# Patient Record
Sex: FEMALE | Race: WHITE | NOT HISPANIC OR LATINO | Employment: OTHER | URBAN - METROPOLITAN AREA
[De-identification: names, ages, dates, MRNs, and addresses within clinical notes are randomized per-mention and may not be internally consistent; named-entity substitution may affect disease eponyms.]

---

## 2017-02-16 ENCOUNTER — OFFICE VISIT (OUTPATIENT)
Dept: MEDICAL GROUP | Facility: PHYSICIAN GROUP | Age: 67
End: 2017-02-16
Payer: COMMERCIAL

## 2017-02-16 VITALS
HEART RATE: 62 BPM | RESPIRATION RATE: 14 BRPM | WEIGHT: 137 LBS | HEIGHT: 62 IN | OXYGEN SATURATION: 97 % | DIASTOLIC BLOOD PRESSURE: 78 MMHG | TEMPERATURE: 97.8 F | BODY MASS INDEX: 25.21 KG/M2 | SYSTOLIC BLOOD PRESSURE: 128 MMHG

## 2017-02-16 DIAGNOSIS — Z12.39 SCREENING FOR BREAST CANCER: ICD-10-CM

## 2017-02-16 DIAGNOSIS — R92.2 DENSE BREASTS: ICD-10-CM

## 2017-02-16 DIAGNOSIS — R92.30 DENSE BREASTS: ICD-10-CM

## 2017-02-16 DIAGNOSIS — M25.511 CHRONIC RIGHT SHOULDER PAIN: ICD-10-CM

## 2017-02-16 DIAGNOSIS — G89.29 CHRONIC RIGHT SHOULDER PAIN: ICD-10-CM

## 2017-02-16 DIAGNOSIS — E89.40 POST HYSTERECTOMY MENOPAUSE SYNDROME: ICD-10-CM

## 2017-02-16 DIAGNOSIS — Z90.710 POST HYSTERECTOMY MENOPAUSE SYNDROME: ICD-10-CM

## 2017-02-16 PROCEDURE — 99214 OFFICE O/P EST MOD 30 MIN: CPT | Performed by: FAMILY MEDICINE

## 2017-02-16 NOTE — MR AVS SNAPSHOT
"        Jon Garcia   2017 10:40 AM   Office Visit   MRN: [id number]    Department:  [facility]   Dept Phone:  [phone]    Description:  Female : 1950   Provider:  Bertha Gómez M.D.           Reason for Visit     Follow-Up shoulder      Allergies as of 2017     Allergen Noted Reactions    Ceftin [Cefuroxime Axetil] 10/07/2015       Pruritis      You were diagnosed with     Chronic right shoulder pain   [723356]       Post hysterectomy menopause syndrome   [492144]       Dense breasts   [934048]       Screening for breast cancer   [818777]         Vital Signs     Blood Pressure Pulse Temperature Respirations Height Weight    128/78 mmHg 62 36.6 °C (97.8 °F) 14 1.575 m (5' 2.01\") 62.143 kg (137 lb)    Body Mass Index Oxygen Saturation Smoking Status             25.05 kg/m2 97% Former Smoker         Basic Information     Date Of Birth Sex Race Ethnicity Preferred Language    1950 Female Unable to Obtain Unknown English      Problem List              ICD-10-CM Priority Class Noted - Resolved    Post hysterectomy menopause syndrome E89.40, Z90.710   10/7/2015 - Present    Herpes simplex labialis B00.1   10/7/2015 - Present    Vitamin D deficiency E55.9   10/7/2015 - Present    Routine health maintenance Z00.00   10/7/2015 - Present    Chronic neck pain M54.2, G89.29   10/7/2015 - Present    Tinnitus H93.19   10/7/2015 - Present    Dense breasts R92.2   2016 - Present    Right shoulder pain M25.511   2016 - Present      Health Maintenance        Date Due Completion Dates    IMM DTaP/Tdap/Td Vaccine (1 - Tdap) 1971 ---    IMM ZOSTER VACCINE 2012 ---    IMM INFLUENZA (1) 2016 10/23/2015    MAMMOGRAM 2017, 2016, 2015 (Done)    Override on 2015: Done    COLON CANCER SCREENING ANNUAL FIT 2017, 2015    PAP SMEAR 2018            Current Immunizations     Influenza Vaccine Quad Inj (Preserved) " 10/23/2015      Below and/or attached are the medications your provider expects you to take. Review all of your home medications and newly ordered medications with your provider and/or pharmacist. Follow medication instructions as directed by your provider and/or pharmacist. Please keep your medication list with you and share with your provider. Update the information when medications are discontinued, doses are changed, or new medications (including over-the-counter products) are added; and carry medication information at all times in the event of emergency situations     Allergies:  CEFTIN - (reactions not documented)               Medications  Valid as of: February 16, 2017 - 11:12 AM    Generic Name Brand Name Tablet Size Instructions for use    Calcium   Take  by mouth.        Calcium Carbonate (Suspension) calcium carbonate 1250 MG/5ML Take 500 mg by mouth every 6 hours.        Cholecalciferol (Tab) cholecalciferol 1000 UNIT Take 2,000 Units by mouth every day.        Cyclobenzaprine HCl (Tab) FLEXERIL 5 MG Take 1-2 Tabs by mouth 3 times a day as needed for Muscle Spasms.        Estradiol (Tab) ESTRACE 1 MG Take 1 Tab by mouth every day.        Ibuprofen (Tab) MOTRIN 800 MG Take 1 Tab by mouth every 8 hours as needed for Moderate Pain (with food).        Multiple Vitamins-Minerals   Take  by mouth.        Triamcinolone Acetonide (Aerosol) NASACORT 55 MCG/ACT [place] 2 Sprays in nose 2 Times a Day.        ValACYclovir HCl (Tab) VALTREX 500 MG Take 500 mg by mouth 2 times a day.        .                 Medicines prescribed today were sent to:     [id number]  [place], [place] - [address]    [address] [place] [address]    Phone: [phone] Fax: [phone]    Open 24 Hours?: No    [facility] - [place], [place] - [address] AT PORTAL TO REGISTERED [facility] [address]    [address] [facility] [address]    Phone: [phone] Fax: [phone]    Open 24 Hours?: No        Medication refill instructions:       If your prescription bottle indicates you have medication refills left, it is not necessary to call your provider’s office. Please contact your pharmacy and they will refill your medication.    If your prescription bottle indicates you do not have any refills left, you may request refills at any time through one of the following ways: The online clypd system (except Urgent Care), by calling your provider’s office, or by asking your pharmacy to contact your provider’s office with a refill request. Medication refills are processed only during regular business hours and may not be available until the next business day. Your provider may request additional information or to have a follow-up visit with you prior to refilling your medication.   *Please Note: Medication refills are assigned a new Rx number when refilled electronically. Your pharmacy may indicate that no refills were authorized even though a new prescription for the same medication is available at the pharmacy. Please request the medicine by name with the pharmacy before contacting your provider for a refill.        Your To Do List     Future Labs/Procedures Complete By Expires    MA-MAMMO SCREENING [place] W/SEAN W/O CAD  As directed 2/16/2018         clypd Access Code: Activation code not generated  Current clypd Status: Active

## 2017-02-16 NOTE — PROGRESS NOTES
Chief Complaint   Patient presents with   • Follow-Up     shoulder       HISTORY OF PRESENT ILLNESS: Patient is a 66 y.o. female established patient here today for the following concerns:    1. Chronic right shoulder pain  Jon is here today for follow-up on right shoulder pain. We have thought maybe she had a rotator cuff tendinitis and had her do some physical therapy as well as take some nonsteroidal anti-inflammatory medication. She reports that the shoulder overall seems to improve, except for when she reaches behind her to clasp her bra. She reports that it is not becoming any worse. The pain is not every day. Typically sharp shooting pain that lasts for several seconds to minutes. Relieved with rest, stretching and occasional ibuprofen.    2. Post hysterectomy menopause syndrome  Patient is also status post hysterectomy, cervix remains. Last Pap smear was in 2015, this was normal with negative HPV. She continues on hormone replacement therapy. She would like to discuss how frequently she needs to have screening. We did attempt to decrease the estradiol 2.5, however she needed to go back up to the 1 mg after she became weepy and had increased vasomotor symptoms.    3. Dense breasts  4. Screening for breast cancer  Due in April for breast cancer screening, would like to have additional screening with history of dense breasts. No new masses or lumps noted      Past Medical, Social, and Family history reviewed and updated in EPIC    Allergies:Ceftin    Current Outpatient Prescriptions   Medication Sig Dispense Refill   • Multiple Vitamins-Minerals (MULTIVITAMIN PO) Take  by mouth.     • estradiol (ESTRACE) 1 MG Tab Take 1 Tab by mouth every day. 90 Tab 1   • CALCIUM PO Take  by mouth.     • cyclobenzaprine (FLEXERIL) 5 MG tablet Take 1-2 Tabs by mouth 3 times a day as needed for Muscle Spasms. 90 Tab 3   • ibuprofen (MOTRIN) 800 MG Tab Take 1 Tab by mouth every 8 hours as needed for Moderate Pain (with food).  "90 Tab 0   • triamcinolone (NASACORT) 55 MCG/ACT nasal inhaler Spray 2 Sprays in nose 2 Times a Day. 1 Bottle 5   • vitamin D (CHOLECALCIFEROL) 1000 UNIT Tab Take 2,000 Units by mouth every day.     • valacyclovir (VALTREX) 500 MG Tab Take 500 mg by mouth 2 times a day.     • calcium carbonate 1250 MG/5ML Suspension suspension Take 500 mg by mouth every 6 hours.       No current facility-administered medications for this visit.         ROS:  Review of Systems   Constitutional: Negative for fever, chills, weight loss and malaise/fatigue.   HENT: Negative for ear pain, nosebleeds, congestion, sore throat and neck pain.    Eyes: Negative for blurred vision.   Respiratory: Negative for cough, sputum production, shortness of breath and wheezing.    Cardiovascular: Negative for chest pain, palpitations,  and leg swelling.   Gastrointestinal: Negative for heartburn, nausea, vomiting, diarrhea and abdominal pain.   Genitourinary: Negative for dysuria, urgency and frequency.   Musculoskeletal: Negative for myalgias, back pain and joint pain.   Skin: Negative for rash and itching.   Neurological: Negative for dizziness, tingling, tremors, sensory change, focal weakness and headaches.   Endo/Heme/Allergies: Does not bruise/bleed easily.   Psychiatric/Behavioral: Negative for depression, anxiety, suicidal ideas, insomnia and memory loss.      Exam:  Blood pressure 128/78, pulse 62, temperature 36.6 °C (97.8 °F), resp. rate 14, height 1.575 m (5' 2.01\"), weight 62.143 kg (137 lb), SpO2 97 %.    General:  Well nourished, well developed in NAD  Head is grossly normal.  Neck: Supple without JVD   Pulmonary:  Normal effort.   Cardiovascular: Regular rate  Extremities: no clubbing, cyanosis, or edema.  Psych: affect appropriate      Please note that this dictation was created using voice recognition software. I have made every reasonable attempt to correct obvious errors, but I expect that there are errors of grammar and possibly " content that I did not discover before finalizing the note.    Assessment/Plan:  1. Chronic right shoulder pain  Continue home exercises, ibuprofen as needed.    2. Post hysterectomy menopause syndrome  Continue estradiol 1 mg.    3. Dense breasts  Continue regular screening with mammography, with Tomosythesis    4. Screening for breast cancer    - MA-MAMMO SCREENING BILAT W/SEAN W/O CAD; Future    Follow-up annually sooner when necessary

## 2017-05-24 ENCOUNTER — HOSPITAL ENCOUNTER (OUTPATIENT)
Dept: RADIOLOGY | Facility: MEDICAL CENTER | Age: 67
End: 2017-05-24
Attending: FAMILY MEDICINE
Payer: COMMERCIAL

## 2017-05-24 DIAGNOSIS — Z12.31 VISIT FOR SCREENING MAMMOGRAM: ICD-10-CM

## 2017-05-24 DIAGNOSIS — Z12.39 SCREENING FOR BREAST CANCER: ICD-10-CM

## 2017-05-24 PROCEDURE — 77063 BREAST TOMOSYNTHESIS BI: CPT

## 2017-07-09 ENCOUNTER — PATIENT MESSAGE (OUTPATIENT)
Dept: MEDICAL GROUP | Facility: PHYSICIAN GROUP | Age: 67
End: 2017-07-09

## 2017-07-10 DIAGNOSIS — G89.29 CHRONIC NECK PAIN: ICD-10-CM

## 2017-07-10 DIAGNOSIS — M54.2 CHRONIC NECK PAIN: ICD-10-CM

## 2017-07-10 NOTE — TELEPHONE ENCOUNTER
I need to have my Flexeril and Valtrex prescriptions refilled.  They weren't listed under the prescription refill option so I guess it's been a long time since I've had them filled. Dr. Rushing did not give any refills for the Flexeril prescription and the Valtrex.  Would it be possible to allow these to be refillable? Should these go through the mail order if there are refills allowed?     Thanks!   Blake Garcia     Was the patient seen in the last year in this department? Yes     Does patient have an active prescription for medications requested? No     Received Request Via: Patient

## 2017-07-11 RX ORDER — VALACYCLOVIR HYDROCHLORIDE 500 MG/1
500 TABLET, FILM COATED ORAL 2 TIMES DAILY
Qty: 40 TAB | Refills: 11 | Status: SHIPPED | OUTPATIENT
Start: 2017-07-11 | End: 2018-08-27 | Stop reason: SDUPTHER

## 2017-07-11 RX ORDER — CYCLOBENZAPRINE HCL 5 MG
5-10 TABLET ORAL 3 TIMES DAILY PRN
Qty: 90 TAB | Refills: 3 | Status: SHIPPED | OUTPATIENT
Start: 2017-07-11 | End: 2019-07-24 | Stop reason: SDUPTHER

## 2018-01-02 ENCOUNTER — OFFICE VISIT (OUTPATIENT)
Dept: MEDICAL GROUP | Facility: PHYSICIAN GROUP | Age: 68
End: 2018-01-02
Payer: MEDICARE

## 2018-01-02 VITALS
TEMPERATURE: 98.2 F | DIASTOLIC BLOOD PRESSURE: 84 MMHG | BODY MASS INDEX: 23.19 KG/M2 | HEART RATE: 82 BPM | HEIGHT: 62 IN | WEIGHT: 126 LBS | RESPIRATION RATE: 14 BRPM | OXYGEN SATURATION: 97 % | SYSTOLIC BLOOD PRESSURE: 122 MMHG

## 2018-01-02 DIAGNOSIS — R05.9 COUGH: ICD-10-CM

## 2018-01-02 DIAGNOSIS — Z12.11 SCREENING FOR COLON CANCER: ICD-10-CM

## 2018-01-02 DIAGNOSIS — Z79.890 POST-MENOPAUSE ON HRT (HORMONE REPLACEMENT THERAPY): ICD-10-CM

## 2018-01-02 DIAGNOSIS — Z13.29 SCREENING FOR ENDOCRINE DISORDER: ICD-10-CM

## 2018-01-02 DIAGNOSIS — Z13.6 SCREENING FOR CARDIOVASCULAR CONDITION: ICD-10-CM

## 2018-01-02 DIAGNOSIS — E55.9 VITAMIN D DEFICIENCY: ICD-10-CM

## 2018-01-02 DIAGNOSIS — J01.90 ACUTE NON-RECURRENT SINUSITIS, UNSPECIFIED LOCATION: ICD-10-CM

## 2018-01-02 PROCEDURE — 99214 OFFICE O/P EST MOD 30 MIN: CPT | Performed by: FAMILY MEDICINE

## 2018-01-02 RX ORDER — AMOXICILLIN AND CLAVULANATE POTASSIUM 875; 125 MG/1; MG/1
1 TABLET, FILM COATED ORAL 2 TIMES DAILY
Qty: 20 TAB | Refills: 0 | Status: SHIPPED | OUTPATIENT
Start: 2018-01-02 | End: 2018-01-12

## 2018-01-02 ASSESSMENT — PATIENT HEALTH QUESTIONNAIRE - PHQ9: CLINICAL INTERPRETATION OF PHQ2 SCORE: 0

## 2018-01-03 NOTE — PROGRESS NOTES
Chief Complaint   Patient presents with   • Cough   • Nasal Congestion   • Sinusitis   • Medication Refill     estradiol       HISTORY OF PRESENT ILLNESS: Patient is a 67 y.o. female established patient here today for the following concerns:      Jon is here today for cough, congestion sinus pressure for the last week, worsening with thick greenish discharge.  No further fevers chills.  No wheezing.      Due for routine labs next month.     Continues on HRT daily.  She sometimes will skip a dose.  Tolerating well.  Gets annual mammograms.  Has contemplated reducing the dose but is concerned about bone loss and aging effects.         Past Medical, Social, and Family history reviewed and updated in EPIC    Allergies:Ceftin [cefuroxime axetil]    Current Outpatient Prescriptions   Medication Sig Dispense Refill   • BIOTIN PO Take  by mouth.     • Probiotic Product (PROBIOTIC PO) Take  by mouth.     • amoxicillin-clavulanate (AUGMENTIN) 875-125 MG Tab Take 1 Tab by mouth 2 times a day for 10 days. 20 Tab 0   • valacyclovir (VALTREX) 500 MG Tab Take 1 Tab by mouth 2 times a day. 40 Tab 11   • cyclobenzaprine (FLEXERIL) 5 MG tablet Take 1-2 Tabs by mouth 3 times a day as needed for Muscle Spasms. 90 Tab 3   • Multiple Vitamins-Minerals (MULTIVITAMIN PO) Take  by mouth.     • estradiol (ESTRACE) 1 MG Tab Take 1 Tab by mouth every day. 90 Tab 1   • CALCIUM PO Take  by mouth.     • vitamin D (CHOLECALCIFEROL) 1000 UNIT Tab Take 2,000 Units by mouth every day.     • ibuprofen (MOTRIN) 800 MG Tab Take 1 Tab by mouth every 8 hours as needed for Moderate Pain (with food). 90 Tab 0   • triamcinolone (NASACORT) 55 MCG/ACT nasal inhaler Spray 2 Sprays in nose 2 Times a Day. 1 Bottle 5     No current facility-administered medications for this visit.          ROS:  Review of Systems   Constitutional: Negative for fever, chills, weight loss and malaise/fatigue.   HENT: Negative for ear pain, nosebleeds, congestion, sore throat and  "neck pain.    Eyes: Negative for blurred vision.   Respiratory: Negative for cough, sputum production, shortness of breath and wheezing.    Cardiovascular: Negative for chest pain, palpitations,  and leg swelling.   Gastrointestinal: Negative for heartburn, nausea, vomiting, diarrhea and abdominal pain.   Genitourinary: Negative for dysuria, urgency and frequency.   Musculoskeletal: Negative for myalgias, back pain and joint pain.   Skin: Negative for rash and itching.   Neurological: Negative for dizziness, tingling, tremors, sensory change, focal weakness and headaches.   Endo/Heme/Allergies: Does not bruise/bleed easily.   Psychiatric/Behavioral: Negative for depression, anxiety, suicidal ideas, insomnia and memory loss.      Exam:  Blood pressure 122/84, pulse 82, temperature 36.8 °C (98.2 °F), resp. rate 14, height 1.575 m (5' 2.01\"), weight 57.2 kg (126 lb), SpO2 97 %.    General:  Well nourished, well developed in NAD  Head is grossly normal.  HEENT: TM clear bilaterally.  Significant PND. Nasal mucosa edematous with purulent nasal discharge.  Sinus tenderness on palpation.   Neck: Supple without JVD   Pulmonary:  Normal effort. CTAB   Cardiovascular: Regular rate and rhythm  Extremities: no clubbing, cyanosis, or edema.  Psych: affect appropriate      Please note that this dictation was created using voice recognition software. I have made every reasonable attempt to correct obvious errors, but I expect that there are errors of grammar and possibly content that I did not discover before finalizing the note.    Assessment/Plan:  1. Screening for colon cancer  Check   - OCCULT BLOOD FECES IMMUNOASSAY (FIT); Future    2. Cough  Start   - amoxicillin-clavulanate (AUGMENTIN) 875-125 MG Tab; Take 1 Tab by mouth 2 times a day for 10 days.  Dispense: 20 Tab; Refill: 0    3. Acute non-recurrent sinusitis, unspecified location  - amoxicillin-clavulanate (AUGMENTIN) 875-125 MG Tab; Take 1 Tab by mouth 2 times a day for " 10 days.  Dispense: 20 Tab; Refill: 0    4. Vitamin D deficiency  Check levels.   - TSH WITH REFLEX TO FT4; Future  - VITAMIN D,25 HYDROXY; Future    5. Screening for cardiovascular condition  Check   - COMP METABOLIC PANEL; Future  - LIPID PROFILE; Future    6. Screening for endocrine disorder  Check   - TSH WITH REFLEX TO FT4; Future    7. Post-menopause on HRT (hormone replacement therapy)  Continue HRT, discussed Risk and benefit.  Check bone density in spring.   - DS-BONE DENSITY STUDY (DEXA); Future    Well woman exam in Spring

## 2018-01-05 ENCOUNTER — HOSPITAL ENCOUNTER (OUTPATIENT)
Facility: MEDICAL CENTER | Age: 68
End: 2018-01-05
Attending: FAMILY MEDICINE
Payer: MEDICARE

## 2018-01-05 PROCEDURE — 82274 ASSAY TEST FOR BLOOD FECAL: CPT

## 2018-01-08 DIAGNOSIS — Z12.11 SCREENING FOR COLON CANCER: ICD-10-CM

## 2018-01-08 LAB — HEMOCCULT STL QL IA: NEGATIVE

## 2018-03-13 ENCOUNTER — HOSPITAL ENCOUNTER (OUTPATIENT)
Dept: LAB | Facility: MEDICAL CENTER | Age: 68
End: 2018-03-13
Attending: FAMILY MEDICINE
Payer: MEDICARE

## 2018-03-13 DIAGNOSIS — Z13.6 SCREENING FOR CARDIOVASCULAR CONDITION: ICD-10-CM

## 2018-03-13 DIAGNOSIS — E55.9 VITAMIN D DEFICIENCY: ICD-10-CM

## 2018-03-13 DIAGNOSIS — Z13.29 SCREENING FOR ENDOCRINE DISORDER: ICD-10-CM

## 2018-03-13 LAB
25(OH)D3 SERPL-MCNC: 37 NG/ML (ref 30–100)
ALBUMIN SERPL BCP-MCNC: 4 G/DL (ref 3.2–4.9)
ALBUMIN/GLOB SERPL: 1.2 G/DL
ALP SERPL-CCNC: 46 U/L (ref 30–99)
ALT SERPL-CCNC: 16 U/L (ref 2–50)
ANION GAP SERPL CALC-SCNC: 7 MMOL/L (ref 0–11.9)
AST SERPL-CCNC: 17 U/L (ref 12–45)
BILIRUB SERPL-MCNC: 0.6 MG/DL (ref 0.1–1.5)
BUN SERPL-MCNC: 18 MG/DL (ref 8–22)
CALCIUM SERPL-MCNC: 10.1 MG/DL (ref 8.5–10.5)
CHLORIDE SERPL-SCNC: 106 MMOL/L (ref 96–112)
CHOLEST SERPL-MCNC: 169 MG/DL (ref 100–199)
CO2 SERPL-SCNC: 26 MMOL/L (ref 20–33)
CREAT SERPL-MCNC: 0.72 MG/DL (ref 0.5–1.4)
GLOBULIN SER CALC-MCNC: 3.4 G/DL (ref 1.9–3.5)
GLUCOSE SERPL-MCNC: 90 MG/DL (ref 65–99)
HDLC SERPL-MCNC: 75 MG/DL
LDLC SERPL CALC-MCNC: 82 MG/DL
POTASSIUM SERPL-SCNC: 4.5 MMOL/L (ref 3.6–5.5)
PROT SERPL-MCNC: 7.4 G/DL (ref 6–8.2)
SODIUM SERPL-SCNC: 139 MMOL/L (ref 135–145)
TRIGL SERPL-MCNC: 59 MG/DL (ref 0–149)
TSH SERPL DL<=0.005 MIU/L-ACNC: 1.11 UIU/ML (ref 0.38–5.33)

## 2018-03-13 PROCEDURE — 84443 ASSAY THYROID STIM HORMONE: CPT | Mod: GA

## 2018-03-13 PROCEDURE — 82306 VITAMIN D 25 HYDROXY: CPT

## 2018-03-13 PROCEDURE — 36415 COLL VENOUS BLD VENIPUNCTURE: CPT

## 2018-03-13 PROCEDURE — 80061 LIPID PANEL: CPT

## 2018-03-13 PROCEDURE — 80053 COMPREHEN METABOLIC PANEL: CPT

## 2018-03-25 ENCOUNTER — PATIENT MESSAGE (OUTPATIENT)
Dept: MEDICAL GROUP | Facility: PHYSICIAN GROUP | Age: 68
End: 2018-03-25

## 2018-03-25 DIAGNOSIS — Z90.710 POST HYSTERECTOMY MENOPAUSE SYNDROME: ICD-10-CM

## 2018-03-25 DIAGNOSIS — E89.40 POST HYSTERECTOMY MENOPAUSE SYNDROME: ICD-10-CM

## 2018-03-27 RX ORDER — ESTRADIOL 0.5 MG/1
0.5 TABLET ORAL DAILY
Qty: 90 TAB | Refills: 0 | Status: SHIPPED | OUTPATIENT
Start: 2018-03-27 | End: 2018-04-03 | Stop reason: SDUPTHER

## 2018-04-03 DIAGNOSIS — E89.40 POST HYSTERECTOMY MENOPAUSE SYNDROME: ICD-10-CM

## 2018-04-03 DIAGNOSIS — Z90.710 POST HYSTERECTOMY MENOPAUSE SYNDROME: ICD-10-CM

## 2018-04-03 RX ORDER — ESTRADIOL 0.5 MG/1
0.5 TABLET ORAL DAILY
Qty: 90 TAB | Refills: 1 | Status: SHIPPED | OUTPATIENT
Start: 2018-04-03 | End: 2018-11-30 | Stop reason: SDUPTHER

## 2018-05-02 ENCOUNTER — HOSPITAL ENCOUNTER (OUTPATIENT)
Dept: RADIOLOGY | Facility: MEDICAL CENTER | Age: 68
End: 2018-05-02
Attending: FAMILY MEDICINE
Payer: MEDICARE

## 2018-05-02 DIAGNOSIS — M85.88 OTHER SPECIFIED DISORDERS OF BONE DENSITY AND STRUCTURE, OTHER SITE: ICD-10-CM

## 2018-05-02 DIAGNOSIS — Z79.890 POST-MENOPAUSE ON HRT (HORMONE REPLACEMENT THERAPY): ICD-10-CM

## 2018-05-02 PROCEDURE — 77080 DXA BONE DENSITY AXIAL: CPT

## 2018-05-08 ENCOUNTER — OFFICE VISIT (OUTPATIENT)
Dept: MEDICAL GROUP | Facility: PHYSICIAN GROUP | Age: 68
End: 2018-05-08
Payer: MEDICARE

## 2018-05-08 VITALS
OXYGEN SATURATION: 96 % | RESPIRATION RATE: 16 BRPM | SYSTOLIC BLOOD PRESSURE: 130 MMHG | HEIGHT: 62 IN | WEIGHT: 130 LBS | HEART RATE: 76 BPM | TEMPERATURE: 97.9 F | DIASTOLIC BLOOD PRESSURE: 72 MMHG | BODY MASS INDEX: 23.92 KG/M2

## 2018-05-08 DIAGNOSIS — Z23 NEED FOR VACCINATION: ICD-10-CM

## 2018-05-08 DIAGNOSIS — M25.562 ACUTE PAIN OF LEFT KNEE: ICD-10-CM

## 2018-05-08 PROCEDURE — 99214 OFFICE O/P EST MOD 30 MIN: CPT | Mod: 25 | Performed by: INTERNAL MEDICINE

## 2018-05-08 PROCEDURE — 90471 IMMUNIZATION ADMIN: CPT | Performed by: INTERNAL MEDICINE

## 2018-05-08 PROCEDURE — 90750 HZV VACC RECOMBINANT IM: CPT | Performed by: INTERNAL MEDICINE

## 2018-05-08 NOTE — ASSESSMENT & PLAN NOTE
Started having pain of her left knee about a month ago. Her most severe symptoms were Sunday, about 2 days ago, when she was shopping. Pain felt like tightness and it was swollen and stiff. She has a pushing like pain in the back of her knee. She felt a pronounced knot at the back of her knee. Denies trauma, injuries. Rest makes her pain better. Today she was limping. Keeping her knee bent makes it better. Feels tender behind her knee. Gets the pushing sensation when she walks. Anteromedial aspect of her knee was tender to the touch a few days ago. She's tried ice, heat, ibuprofen. Yesterday she tried to stay off of it as much as possible and her pain is much improved today.

## 2018-05-08 NOTE — PROGRESS NOTES
PRIMARY CARE CLINIC ACUTE VISIT  Chief Complaint   Patient presents with   • Knee Pain     Lt side    • Immunizations     Shingrix        History of Present Illness     Jon is a pleasant patient of Dr. Gómez presenting for the following concern:     Acute pain of left knee  Started having pain of her left knee about a month ago. Her most severe symptoms were Sunday, about 2 days ago, when she was shopping. Pain felt like tightness and it was swollen and stiff. She has a pushing like pain in the back of her knee. She felt a pronounced knot at the back of her knee. Denies trauma, injuries. Rest makes her pain better. Today she was limping. Keeping her knee bent makes it better. Feels tender behind her knee. Gets the pushing sensation when she walks. Anteromedial aspect of her knee was tender to the touch a few days ago. She's tried ice, heat, ibuprofen. Yesterday she tried to stay off of it as much as possible and her pain is much improved today.       Current Outpatient Prescriptions   Medication Sig Dispense Refill   • estradiol (ESTRACE) 0.5 MG tablet Take 1 Tab by mouth every day. 90 Tab 1   • BIOTIN PO Take  by mouth.     • Probiotic Product (PROBIOTIC PO) Take  by mouth.     • valacyclovir (VALTREX) 500 MG Tab Take 1 Tab by mouth 2 times a day. 40 Tab 11   • cyclobenzaprine (FLEXERIL) 5 MG tablet Take 1-2 Tabs by mouth 3 times a day as needed for Muscle Spasms. 90 Tab 3   • Multiple Vitamins-Minerals (MULTIVITAMIN PO) Take  by mouth.     • CALCIUM PO Take  by mouth.     • vitamin D (CHOLECALCIFEROL) 1000 UNIT Tab Take 2,000 Units by mouth every day.     • ibuprofen (MOTRIN) 800 MG Tab Take 1 Tab by mouth every 8 hours as needed for Moderate Pain (with food). 90 Tab 0   • triamcinolone (NASACORT) 55 MCG/ACT nasal inhaler Spray 2 Sprays in nose 2 Times a Day. 1 Bottle 5     No current facility-administered medications for this visit.      No past medical history on file.    Allergies: Ceftin [cefuroxime  "axetil]    ROS  As per HPI above. All other systems reviewed and negative.        Objective   Blood pressure 130/72, pulse 76, temperature 36.6 °C (97.9 °F), resp. rate 16, height 1.575 m (5' 2.01\"), weight 59 kg (130 lb), SpO2 96 %. Body mass index is 23.77 kg/m².    General: alert and oriented, pleasant, cooperative  HEENT: Normocephalic, atraumatic.   MSK: swelling and tenderness to palpation of popliteal area of left knee but no warmth   Psychiatric: appropriate mood and affect. Good insight and appropriate judgment     Assessment and Plan   The following treatment plan was discussed     1. Acute pain of left knee  May have Baker's cyst rupture, will evaluate further with MRI. Refer to sports medicine for possible cortisone injections.   - MR-KNEE-W/O LEFT; Future  - REFERRAL TO SPORTS MEDICINE    2. Need for vaccination  Given today.   - Shingles Vaccine (Shingrix)      Return if symptoms worsen or fail to improve.    Nacho Tapia MD  Internal Medicine  [facility]                   "

## 2018-05-10 ENCOUNTER — TELEPHONE (OUTPATIENT)
Dept: MEDICAL GROUP | Facility: PHYSICIAN GROUP | Age: 68
End: 2018-05-10

## 2018-05-10 ENCOUNTER — HOSPITAL ENCOUNTER (OUTPATIENT)
Dept: RADIOLOGY | Facility: MEDICAL CENTER | Age: 68
End: 2018-05-10
Attending: INTERNAL MEDICINE
Payer: MEDICARE

## 2018-05-10 DIAGNOSIS — M25.562 ACUTE PAIN OF LEFT KNEE: ICD-10-CM

## 2018-05-10 PROCEDURE — 73721 MRI JNT OF LWR EXTRE W/O DYE: CPT | Mod: LT

## 2018-05-10 NOTE — TELEPHONE ENCOUNTER
----- Message from Nacho Tapia M.D. sent at 5/10/2018  2:55 PM PDT -----  Please let Jon know that her MRI shows a ruptured cyst for which she should follow up with Dr. Marquez (it seems her referral has been processed and is ready to be scheduled).

## 2018-05-14 ENCOUNTER — OFFICE VISIT (OUTPATIENT)
Dept: MEDICAL GROUP | Facility: CLINIC | Age: 68
End: 2018-05-14
Payer: MEDICARE

## 2018-05-14 VITALS
HEIGHT: 62 IN | HEART RATE: 80 BPM | TEMPERATURE: 98.1 F | DIASTOLIC BLOOD PRESSURE: 68 MMHG | SYSTOLIC BLOOD PRESSURE: 112 MMHG | RESPIRATION RATE: 16 BRPM | OXYGEN SATURATION: 97 % | WEIGHT: 130 LBS | BODY MASS INDEX: 23.92 KG/M2

## 2018-05-14 DIAGNOSIS — M71.22 BAKER CYST, LEFT: ICD-10-CM

## 2018-05-14 DIAGNOSIS — M17.12 PRIMARY OSTEOARTHRITIS OF LEFT KNEE: ICD-10-CM

## 2018-05-14 PROCEDURE — 99203 OFFICE O/P NEW LOW 30 MIN: CPT | Performed by: FAMILY MEDICINE

## 2018-05-14 NOTE — PROGRESS NOTES
CHIEF COMPLAINT:  Jon Garcia female presenting at the request of  Nacho Tapia M.D.  for evaluation of knee pain.     Jon Garcia is complaining of left knee pain   present for 6 weeks  1st incident improved, then felt some tightness all of a sudden  Pain is at the posterior knee  Quality is pressure  Pain is non-radiating   Improved with resting  Aggravated by high level gardening  no prior problems with this area in the past that she can recall   Prior Treatments: seen at PCP office  Prior studies: MRI   Medications tried for pain include: none  Mechanical Symptom history: No Locking    REVIEW OF SYSTEMS  No Nausea, No Vomiting, No Chest Pain, No Shortness of Breath, No Dizziness, No Headache      PAST MEDICAL HISTORY:   History reviewed. No pertinent past medical history.    PMH:  has no past medical history of Asthma; Diabetes (HCC); Hyperlipidemia; Hypertension; or Stroke (HCC).  MEDS:   Current Outpatient Prescriptions:   •  estradiol (ESTRACE) 0.5 MG tablet, Take 1 Tab by mouth every day., Disp: 90 Tab, Rfl: 1  •  BIOTIN PO, Take  by mouth., Disp: , Rfl:   •  Probiotic Product (PROBIOTIC PO), Take  by mouth., Disp: , Rfl:   •  valacyclovir (VALTREX) 500 MG Tab, Take 1 Tab by mouth 2 times a day., Disp: 40 Tab, Rfl: 11  •  cyclobenzaprine (FLEXERIL) 5 MG tablet, Take 1-2 Tabs by mouth 3 times a day as needed for Muscle Spasms., Disp: 90 Tab, Rfl: 3  •  Multiple Vitamins-Minerals (MULTIVITAMIN PO), Take  by mouth., Disp: , Rfl:   •  CALCIUM PO, Take  by mouth., Disp: , Rfl:   •  ibuprofen (MOTRIN) 800 MG Tab, Take 1 Tab by mouth every 8 hours as needed for Moderate Pain (with food)., Disp: 90 Tab, Rfl: 0  •  triamcinolone (NASACORT) 55 MCG/ACT nasal inhaler, Spray 2 Sprays in nose 2 Times a Day., Disp: 1 Bottle, Rfl: 5  •  vitamin D (CHOLECALCIFEROL) 1000 UNIT Tab, Take 2,000 Units by mouth every day., Disp: , Rfl:   ALLERGIES:   Allergies   Allergen Reactions   • Ceftin [Cefuroxime Axetil]      Pruritis  "    SURGHX:   Past Surgical History:   Procedure Laterality Date   • HYSTERECTOMY, TOTAL ABDOMINAL  2002    Still has cervix. Due to fibroids   • HERNIA REPAIR     • TONSILLECTOMY      Child     SOCHX:  reports that she quit smoking about 35 years ago. Her smoking use included Cigarettes. She has a 6.00 pack-year smoking history. She has never used smokeless tobacco. She reports that she drinks about 0.6 oz of alcohol per week . She reports that she does not use drugs.  FH: Family history was reviewed, no pertinent findings to report     PHYSICAL EXAM:  /68   Pulse 80   Temp 36.7 °C (98.1 °F)   Resp 16   Ht 1.575 m (5' 2.01\")   Wt 59 kg (130 lb)   SpO2 97%   BMI 23.77 kg/m²       well-developed, well-nourished in no apparent distress, alert and oriented x 3.  Gait: normal     RIGHT Knee:  Slight Varus and No Swelling  Range of Motion Intact  Trace effusion  Patellar No tenderness and no apprehension  Medial Joint Line Tenderness and NEGATIVE Ruth  Lateral Joint Line Non-tender and NEGATIVE Ruth  Trace Laxity with Varus stress  Trace Laxity with Valgus stress  Lachman's testing is Trace  Posterior Drawer Testing is Trace  The leg is otherwise neurovascularly intact    LEFT Knee:  Slight Varus and No Swelling   Range of Motion Slightly limited with Flexion  2+ effusion  Patellar No tenderness and no apprehension  Medial Joint Line Tenderness and NEGATIVE Ruth  Lateral Joint Line Non-tender and NEGATIVE Ruth  Trace Laxity with Varus stress  Trace Laxity with Valgus stress  Lachman's testing is Trace  Posterior Drawer Testing is Trace  The leg is otherwise neurovascularly intact    Additional Findings: None      1. Baker cyst, left     2. Primary osteoarthritis of left knee  REFERRAL TO PHYSICAL THERAPY Reason for Therapy: Eval/Treat/Report     Discussed knee osteoarthritis management options includin.  Joint protection, SAMe and anti-inflammatories   2.  Non-offending activities such " as cycling or swimming   3.  Knee bracing, Acupuncture  4. Injection options including corticosteroid, viscosupplementation and stem cell injections  5. Surgical options    She would like to try a knee brace an PT to start    Return in about 4 weeks (around 6/11/2018).    5/10/2018 9:51 AM    HISTORY/REASON FOR EXAM:  Acute posterior left knee pain. No known injury.      TECHNIQUE/EXAM DESCRIPTION:  MRI of the LEFT knee without contrast.    Using a Micki 1.5 Litzy MRI scanner, T1 coronal, proton density fat-suppressed coronal, fast spin-echo T2 fat-suppressed axial, intermediate fast spin-echo sagittal, and intermediate fast spin-echo fat-suppressed sagittal images were obtained.    COMPARISON: None    FINDINGS:    Menisci: Intact.    Tendons and Ligaments: The cruciate ligaments are intact.  The collateral ligament complexes are intact. There is mild edema around the medial collateral ligament complex.    Extensor Mechanism/Patella: Intact.  There is lateral tilt of the patella. There is high-grade partial and full-thickness articular cartilage thinning of the lateral patellar facet with mild subchondral edema. Osteophytes project from the lateral   patellar facet. There is full-thickness articular cartilage loss on the opposing lateral trochlea with mild subchondral edema. There is edema in the superolateral aspect of the infrapatellar fat.    Osseous Structures/Cartilaginous Surfaces: There is hypertrophy of the tibial spines. Tiny osteophytes project from the medial and lateral compartment. There is focal high-grade partial thickness articular cartilage loss on the medial femoral condyle   measuring approximately 1.6 mm in width.    Miscellaneous:  There is a moderate to large joint effusion.  There is a large multiloculated Baker's cyst.  Fluid extends inferiorly superficial to the medial head of the gastrocnemius muscle.   Impression       1.  Large multiloculated Baker's cyst with fluid extending inferiorly  superficial to the medial head of the gastrocnemius muscle. Findings are consistent with cyst rupture.    2.  Moderate to large knee effusion.    3.  Severe chondromalacia patella. Full-thickness articular cartilage loss in the lateral patellar facet and opposing trochlea with mild subchondral edema and lateral tilt of the patella. Edema in the superolateral aspect of Hoffa's fat pad. 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    4.  Minimal degenerative change in the medial and lateral compartments.     done elsewhere and reviewed independently by me    Thank you Nacho Tapia M.D. for allowing me to participate in caring for your patient.

## 2018-05-22 ENCOUNTER — HOSPITAL ENCOUNTER (OUTPATIENT)
Dept: RADIOLOGY | Facility: MEDICAL CENTER | Age: 68
End: 2018-05-22
Attending: FAMILY MEDICINE
Payer: MEDICARE

## 2018-05-22 DIAGNOSIS — Z12.31 VISIT FOR SCREENING MAMMOGRAM: ICD-10-CM

## 2018-05-22 PROCEDURE — 77063 BREAST TOMOSYNTHESIS BI: CPT

## 2018-06-07 ENCOUNTER — OFFICE VISIT (OUTPATIENT)
Dept: MEDICAL GROUP | Facility: PHYSICIAN GROUP | Age: 68
End: 2018-06-07
Payer: MEDICARE

## 2018-06-07 ENCOUNTER — HOSPITAL ENCOUNTER (OUTPATIENT)
Facility: MEDICAL CENTER | Age: 68
End: 2018-06-07
Attending: FAMILY MEDICINE
Payer: MEDICARE

## 2018-06-07 VITALS
SYSTOLIC BLOOD PRESSURE: 128 MMHG | HEIGHT: 62 IN | OXYGEN SATURATION: 98 % | RESPIRATION RATE: 14 BRPM | BODY MASS INDEX: 23.37 KG/M2 | TEMPERATURE: 98.8 F | WEIGHT: 127 LBS | DIASTOLIC BLOOD PRESSURE: 68 MMHG | HEART RATE: 78 BPM

## 2018-06-07 DIAGNOSIS — Z01.419 WELL WOMAN EXAM WITH ROUTINE GYNECOLOGICAL EXAM: ICD-10-CM

## 2018-06-07 DIAGNOSIS — Z12.4 SCREENING FOR CERVICAL CANCER: ICD-10-CM

## 2018-06-07 DIAGNOSIS — Z23 NEED FOR VACCINATION: ICD-10-CM

## 2018-06-07 PROCEDURE — G0009 ADMIN PNEUMOCOCCAL VACCINE: HCPCS | Performed by: FAMILY MEDICINE

## 2018-06-07 PROCEDURE — G0101 CA SCREEN;PELVIC/BREAST EXAM: HCPCS | Mod: 25 | Performed by: FAMILY MEDICINE

## 2018-06-07 PROCEDURE — 88175 CYTOPATH C/V AUTO FLUID REDO: CPT

## 2018-06-07 PROCEDURE — 87624 HPV HI-RISK TYP POOLED RSLT: CPT

## 2018-06-07 PROCEDURE — 90670 PCV13 VACCINE IM: CPT | Performed by: FAMILY MEDICINE

## 2018-06-07 NOTE — PROGRESS NOTES
Chief Complaint   Patient presents with   • Gynecologic Exam       HISTORY OF PRESENT ILLNESS: Patient is a 67 y.o. female est patient who presents today to discuss the following issues:    1. Well woman exam with routine gynecological exam  2. Need for vaccination  3. Screening for cervical cancer  This is a pleasant 67-year-old female here today for well woman check.  She has history of hysterectomy for benign causes, cervix remains.  Last Pap smear was 3 years ago and normal with negative HPV.  Because she is on hormone replacement therapy she would like to continue pelvic exams.  She did have an updated mammogram which demonstrated no additional lesions.  She denies any changes in the breasts.  She is due for pneumonia and Tdap vaccinations, however would like to defer the Tdap and will agree to having the pneumonia vaccination.  Labs are updated and normal.        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 90 Tab 1   • BIOTIN PO Take  by mouth.     • Probiotic Product (PROBIOTIC PO) Take  by mouth.     • valacyclovir (VALTREX) 500 MG Tab Take 1 Tab by mouth 2 times a day. 40 Tab 11   • cyclobenzaprine (FLEXERIL) 5 MG tablet Take 1-2 Tabs by mouth 3 times a day as needed for Muscle Spasms. 90 Tab 3   • Multiple Vitamins-Minerals (MULTIVITAMIN PO) Take  by mouth.     • CALCIUM PO Take  by mouth.     • ibuprofen  (MOTRIN) 800 MG Tab Take 1 Tab by mouth every 8 hours as needed for Moderate Pain (with food). 90 Tab 0   • triamcinolone (NASACORT) 55 MCG/ACT nasal inhaler Spray 2 Sprays in nose 2 Times a Day. 1 Bottle 5   • vitamin D (CHOLECALCIFEROL) 1000 UNIT Tab Take 2,000 Units by mouth every day.       No current facility-administered medications for this visit.        Social History   Substance Use Topics   • Smoking status: Former Smoker     Packs/day: 0.50     Years: 12.00     Types: Cigarettes     Quit date: 10/7/1982   • Smokeless tobacco: Never Used      Comment: Started in    • Alcohol use 0.6 oz/week     1 Glasses of wine per week      Comment: 1-2 wine with dinner 2-3 times a week.       Family Status   Relation Status   • Mother  at age 91   • Father  at age 97   • Brother  at age 54   • Brother Alive   • Brother Alive   • Son Alive   • Son Alive     Family History   Problem Relation Age of Onset   • Stroke Mother    • Hypertension Mother    • Other Father      glaucoma   • Arthritis Father      knee/hip replacement   • Hypertension Brother    • Cancer Brother 54     Pancreatic   • Hypertension Brother    • Lung Disease Brother      asthma   • Hypertension Brother    • Arthritis Brother      knee replacement     Health Maintenance Due   Topic Date Due   • Annual Wellness Visit  1950   • IMM DTaP/Tdap/Td Vaccine (1 - Tdap) 1971   • IMM PNEUMOCOCCAL 65+ (ADULT) LOW/MEDIUM RISK SERIES (1 of 2 - PCV13) 2017   • PAP SMEAR  2018       ROS:  Review of Systems   Constitutional: Negative for fever, chills, weight loss and malaise/fatigue.   HENT: Negative for ear pain, nosebleeds, congestion, sore throat and neck pain.    Eyes: Negative for blurred vision.   Respiratory: Negative for cough, sputum production, shortness of breath and wheezing.    Cardiovascular: Negative for chest pain, palpitations, orthopnea and leg swelling.   Gastrointestinal: Negative for heartburn,  "nausea, vomiting and abdominal pain.   Genitourinary: Negative for dysuria, urgency and frequency.   Musculoskeletal: Negative for myalgias, back pain and joint pain.   Skin: Negative for rash and itching.   Neurological: Negative for dizziness, tingling, tremors, sensory change, focal weakness and headaches.   Endo/Heme/Allergies: Does not bruise/bleed easily.   Psychiatric/Behavioral: Negative for depression, suicidal ideas and memory loss.  The patient is not nervous/anxious and does not have insomnia.      Exam:  Blood pressure 128/68, pulse 78, temperature 37.1 °C (98.8 °F), resp. rate 14, height 1.575 m (5' 2.01\"), weight 57.6 kg (127 lb), SpO2 98 %.  General:  Well nourished, well developed female in NAD  HEENT: Normocephalic and atraumatic. TMs clear bilaterally. Oropharynx is clear      without erythema and no tonsillar exudate. Nasal mucosa pink with minimal      Rhinorrhea.  EYES: EOMI.  Conjunctiva clear.    Neck: Supple without JVD or bruit. Thyroid is not enlarged.  Pulmonary: Clear to ausculation and percussion.  Normal effort. No rales, ronchi, or wheezing.  Cardiovascular: Regular rate and rhythm without murmur, no peripheral edema  Abdomen: positive bowel sounds.  Non tender, non distended, no hepatosplenomegaly.   MSK: normal ROM in upper and lower extremities bilaterally  Psych: appropriate affect and concentration, oriented to person and place  Neuro: no unilateral weakness in upper or lower extremities.  No gait disturbance  Breasts: normal appearance, no skin changes, no masses, nipple discharge, or LAD  External genitalia without lesions  Vaginal mucosa pink and well rugated  Minimal cervical discharge  Pap obtained    Bimanual exam shows absent uterus with no adnexal tenderness or masses      Please note that this dictation was created using voice recognition software. I have made every reasonable attempt to correct obvious errors, but I expect that there are errors of grammar and possibly " content that I did not discover before finalizing the note.    Assessment/Plan:  1. Well woman exam with routine gynecological exam  Continue healthy lifestyle, regular exercise    2. Need for vaccination  - Pneumococcal Conjugate Vaccine 13-Valent <6yo IM    3. Screening for cervical cancer  - THINPREP PAP WITH HPV; Future

## 2018-06-08 DIAGNOSIS — Z12.4 SCREENING FOR CERVICAL CANCER: ICD-10-CM

## 2018-06-10 LAB
CYTOLOGY REG CYTOL: NORMAL
HPV HR 12 DNA CVX QL NAA+PROBE: NEGATIVE
HPV16 DNA SPEC QL NAA+PROBE: NEGATIVE
HPV18 DNA SPEC QL NAA+PROBE: NEGATIVE
SPECIMEN SOURCE: NORMAL

## 2018-08-27 NOTE — TELEPHONE ENCOUNTER
Was the patient seen in the last year in this department? Yes    Does patient have an active prescription for medications requested? No     Received Request Via: Patient mychart    requesting a 90 day supply.

## 2018-08-29 RX ORDER — VALACYCLOVIR HYDROCHLORIDE 500 MG/1
500 TABLET, FILM COATED ORAL 2 TIMES DAILY
Qty: 40 TAB | Refills: 2 | Status: SHIPPED | OUTPATIENT
Start: 2018-08-29 | End: 2020-03-13 | Stop reason: SDUPTHER

## 2018-08-29 NOTE — TELEPHONE ENCOUNTER
Was the patient seen in the last year in this department? Yes    Does patient have an active prescription for medications requested? No     Received Request Via: Pharmacy      Pt met protocol?: Yes    OV 6/18

## 2018-11-30 DIAGNOSIS — Z90.710 POST HYSTERECTOMY MENOPAUSE SYNDROME: ICD-10-CM

## 2018-11-30 DIAGNOSIS — E89.40 POST HYSTERECTOMY MENOPAUSE SYNDROME: ICD-10-CM

## 2018-12-01 NOTE — TELEPHONE ENCOUNTER
Was the patient seen in the last year in this department? Yes    Does patient have an active prescription for medications requested? No     Received Request Via: Pharmacy      Pt met protocol?: Yes    LAST OV 06/07/2018

## 2018-12-02 RX ORDER — ESTRADIOL 0.5 MG/1
0.5 TABLET ORAL DAILY
Qty: 90 TAB | Refills: 1 | Status: SHIPPED | OUTPATIENT
Start: 2018-12-02 | End: 2019-10-01

## 2019-03-14 ENCOUNTER — OFFICE VISIT (OUTPATIENT)
Dept: MEDICAL GROUP | Facility: PHYSICIAN GROUP | Age: 69
End: 2019-03-14
Payer: MEDICARE

## 2019-03-14 VITALS
HEART RATE: 90 BPM | BODY MASS INDEX: 22.63 KG/M2 | WEIGHT: 123 LBS | OXYGEN SATURATION: 95 % | RESPIRATION RATE: 14 BRPM | DIASTOLIC BLOOD PRESSURE: 82 MMHG | SYSTOLIC BLOOD PRESSURE: 140 MMHG | TEMPERATURE: 97.9 F | HEIGHT: 62 IN

## 2019-03-14 DIAGNOSIS — M25.562 CHRONIC PAIN OF LEFT KNEE: ICD-10-CM

## 2019-03-14 DIAGNOSIS — Z12.12 SCREENING FOR COLORECTAL CANCER: ICD-10-CM

## 2019-03-14 DIAGNOSIS — M54.2 CHRONIC NECK PAIN: ICD-10-CM

## 2019-03-14 DIAGNOSIS — G89.29 CHRONIC PAIN OF LEFT KNEE: ICD-10-CM

## 2019-03-14 DIAGNOSIS — G89.29 CHRONIC NECK PAIN: ICD-10-CM

## 2019-03-14 DIAGNOSIS — Z12.11 SCREENING FOR COLORECTAL CANCER: ICD-10-CM

## 2019-03-14 DIAGNOSIS — H65.491 CHRONIC MEE (MIDDLE EAR EFFUSION), RIGHT: ICD-10-CM

## 2019-03-14 DIAGNOSIS — H91.93 BILATERAL HEARING LOSS, UNSPECIFIED HEARING LOSS TYPE: ICD-10-CM

## 2019-03-14 DIAGNOSIS — L81.4 SOLAR LENTIGO: ICD-10-CM

## 2019-03-14 DIAGNOSIS — M54.12 CERVICAL RADICULOPATHY: ICD-10-CM

## 2019-03-14 PROCEDURE — 99214 OFFICE O/P EST MOD 30 MIN: CPT | Performed by: FAMILY MEDICINE

## 2019-03-14 RX ORDER — FLUTICASONE PROPIONATE 50 MCG
2 SPRAY, SUSPENSION (ML) NASAL DAILY
Qty: 16 G | Refills: 11 | Status: SHIPPED | OUTPATIENT
Start: 2019-03-14 | End: 2019-10-01

## 2019-03-14 RX ORDER — DIAZEPAM 5 MG/1
10 TABLET ORAL
Qty: 2 TAB | Refills: 0 | Status: SHIPPED
Start: 2019-03-14 | End: 2019-03-14

## 2019-03-14 ASSESSMENT — PATIENT HEALTH QUESTIONNAIRE - PHQ9: CLINICAL INTERPRETATION OF PHQ2 SCORE: 0

## 2019-03-14 NOTE — PROGRESS NOTES
Chief Complaint   Patient presents with   • Numbness     neck   • Other     spots on face and arm       HISTORY OF PRESENT ILLNESS: Patient is a 68 y.o. female established patient here today for the following concerns:    1. Chronic neck pain  2. Cervical radiculopathy  Long standing hx of neck pain with some radiation down the right arm.  Now worsening affecting her day and night.  Causing numbness into the hands, causing her to drop items.  Sometimes can find a comfortable position, worsens at night.  No change in gait.  No leg weakness.  Feels a lot of crepitus.  Was told years ago she had herniated discs on imaging >10 years ago.  Has been trying to work on ROM.      3. Chronic pain of left knee  Reports worsening left knee pain which is different than previous.  Now located medially with aching sore to the touch pain.  It seems to come and go.  Sometimes worse with the previous exercises given for the OA, Baker's cyst and Patellar friction syndrome previously noted on MRI last year.  No increase in swelling, locking or giving way.  No new injuries.      4. Screening for colorectal cancer  Due for FIT test.     5. Solar lentigo  Noted pigmented skin lesion on the arm, maybe a few months ago.  No itching or bleeding.     6. Bilateral hearing loss, unspecified hearing loss type  7. Chronic JOSUÉ (middle ear effusion), right  Reports greater than 1 year if not more than 3 years of decreasing hearing.   It affects both ears.  Associated with some high pitched tinnitus.  She feels a lot of pressure in the ears.  In the past had been told likely needed hearing aids.  She does have some nasal and eye allergy symptoms that she was on Nasacort with for some time and had not noted any improvement.       Past Medical, Social, and Family history reviewed and updated in EPIC    Allergies:Ceftin [cefuroxime axetil]    Current Outpatient Prescriptions   Medication Sig Dispense Refill   • diazePAM (VALIUM) 5 MG Tab Take 2 Tabs by  "mouth MRI Once for 1 dose. 2 Tab 0   • fluticasone (FLONASE) 50 MCG/ACT nasal spray Spray 2 Sprays in nose every day. Each Nostril 16 g 11   • estradiol (ESTRACE) 0.5 MG tablet Take 1 Tab by mouth every day. 90 Tab 1   • BIOTIN PO Take  by mouth.     • Probiotic Product (PROBIOTIC PO) Take  by mouth.     • Multiple Vitamins-Minerals (MULTIVITAMIN PO) Take  by mouth.     • CALCIUM PO Take  by mouth.     • vitamin D (CHOLECALCIFEROL) 1000 UNIT Tab Take 2,000 Units by mouth every day.     • valACYclovir (VALTREX) 500 MG Tab Take 1 Tab by mouth 2 times a day. 40 Tab 2   • cyclobenzaprine (FLEXERIL) 5 MG tablet Take 1-2 Tabs by mouth 3 times a day as needed for Muscle Spasms. 90 Tab 3   • ibuprofen (MOTRIN) 800 MG Tab Take 1 Tab by mouth every 8 hours as needed for Moderate Pain (with food). 90 Tab 0     No current facility-administered medications for this visit.          ROS:  Review of Systems   Constitutional: Negative for fever, chills, weight loss and malaise/fatigue.   HENT: Negative for ear pain, nosebleeds, congestion, sore throat and neck pain.    Eyes: Negative for blurred vision.   Respiratory: Negative for cough, sputum production, shortness of breath and wheezing.    Cardiovascular: Negative for chest pain, palpitations,  and leg swelling.   Gastrointestinal: Negative for heartburn, nausea, vomiting, diarrhea and abdominal pain.   Genitourinary: Negative for dysuria, urgency and frequency.   Musculoskeletal: Negative for myalgias, back pain and joint pain.   Skin: Negative for rash and itching.   Neurological: Negative for dizziness, tingling, tremors, sensory change, focal weakness and headaches.   Endo/Heme/Allergies: Does not bruise/bleed easily.   Psychiatric/Behavioral: Negative for depression, anxiety, suicidal ideas, insomnia and memory loss.      Exam:  Blood pressure 140/82, pulse 90, temperature 36.6 °C (97.9 °F), resp. rate 14, height 1.575 m (5' 2.01\"), weight 55.8 kg (123 lb), SpO2 95 " %.    General:  Well nourished, well developed in NAD  Head is grossly normal.  Neck: Supple without JVD   Pulmonary:  Normal effort.   Cardiovascular: Regular rate  Extremities: no clubbing, cyanosis, or edema.  Psych: affect appropriate  MSK: left knee without effusion, medial insertion point tenderness.  No ligamentous laxity appreciated.     strength symmetric 5/5 bilaterally, sensation to light sensation upper extremities equal.      Please note that this dictation was created using voice recognition software. I have made every reasonable attempt to correct obvious errors, but I expect that there are errors of grammar and possibly content that I did not discover before finalizing the note.    Assessment/Plan:  1. Chronic neck pain  Check   - MR-CERVICAL SPINE-W/O; Future  - diazePAM (VALIUM) 5 MG Tab; Take 2 Tabs by mouth MRI Once for 1 dose.  Dispense: 2 Tab; Refill: 0    2. Cervical radiculopathy  - MR-CERVICAL SPINE-W/O; Future  - diazePAM (VALIUM) 5 MG Tab; Take 2 Tabs by mouth MRI Once for 1 dose.  Dispense: 2 Tab; Refill: 0    3. Chronic pain of left knee  - REFERRAL TO PHYSICAL THERAPY Reason for Therapy: Eval/Treat/Report    4. Screening for colorectal cancer  - OCCULT BLOOD FECES IMMUNOASSAY (FIT); Future    5. Solar lentigo  Observation, sunscreen    6. Bilateral hearing loss, unspecified hearing loss type    - REFERRAL TO ENT  - fluticasone (FLONASE) 50 MCG/ACT nasal spray; Spray 2 Sprays in nose every day. Each Nostril  Dispense: 16 g; Refill: 11    7. Chronic JOSUÉ (middle ear effusion), right  - REFERRAL TO ENT  - fluticasone (FLONASE) 50 MCG/ACT nasal spray; Spray 2 Sprays in nose every day. Each Nostril  Dispense: 16 g; Refill: 11  Neilmed sinus rinse.

## 2019-03-18 ENCOUNTER — HOSPITAL ENCOUNTER (OUTPATIENT)
Facility: MEDICAL CENTER | Age: 69
End: 2019-03-18
Attending: FAMILY MEDICINE
Payer: MEDICARE

## 2019-03-18 PROCEDURE — 82274 ASSAY TEST FOR BLOOD FECAL: CPT

## 2019-03-19 DIAGNOSIS — Z12.11 SCREENING FOR COLORECTAL CANCER: ICD-10-CM

## 2019-03-19 DIAGNOSIS — Z12.12 SCREENING FOR COLORECTAL CANCER: ICD-10-CM

## 2019-03-19 LAB — HEMOCCULT STL QL IA: NEGATIVE

## 2019-03-22 ENCOUNTER — TELEPHONE (OUTPATIENT)
Dept: MEDICAL GROUP | Facility: PHYSICIAN GROUP | Age: 69
End: 2019-03-22

## 2019-03-22 NOTE — TELEPHONE ENCOUNTER
1. Caller Name: Jon                                          Call Back Number: [phone] (home)        Patient approves a detailed voicemail message: N\A    pt calling because Jessi Moise didnt have her Valium script. Needs it called in.  Also pt called and needs referral refaxed to [facility]

## 2019-03-25 ENCOUNTER — HOSPITAL ENCOUNTER (OUTPATIENT)
Dept: RADIOLOGY | Facility: MEDICAL CENTER | Age: 69
End: 2019-03-25
Attending: FAMILY MEDICINE
Payer: MEDICARE

## 2019-03-25 DIAGNOSIS — G89.29 CHRONIC NECK PAIN: ICD-10-CM

## 2019-03-25 DIAGNOSIS — M54.2 CHRONIC NECK PAIN: ICD-10-CM

## 2019-03-25 DIAGNOSIS — M54.12 CERVICAL RADICULOPATHY: ICD-10-CM

## 2019-03-25 PROCEDURE — 72141 MRI NECK SPINE W/O DYE: CPT

## 2019-03-26 ENCOUNTER — TELEPHONE (OUTPATIENT)
Dept: MEDICAL GROUP | Facility: PHYSICIAN GROUP | Age: 69
End: 2019-03-26

## 2019-03-26 DIAGNOSIS — M48.02 NEUROFORAMINAL STENOSIS OF CERVICAL SPINE: ICD-10-CM

## 2019-03-26 DIAGNOSIS — M54.2 CHRONIC NECK PAIN: ICD-10-CM

## 2019-03-26 DIAGNOSIS — G89.29 CHRONIC NECK PAIN: ICD-10-CM

## 2019-03-27 NOTE — TELEPHONE ENCOUNTER
Phone Number Called: [phone] (home)     Message: Pt notified on results and message.     Left Message for patient to call back: N\A

## 2019-03-27 NOTE — TELEPHONE ENCOUNTER
----- Message from Bertha Gómez M.D. sent at 3/26/2019  5:15 PM PDT -----  MRI of the neck shows lots of arthritis, disk bulges and impingement of nerves.  Recommend consultation with physiatry for help in conservative treatment

## 2019-04-11 ENCOUNTER — OFFICE VISIT (OUTPATIENT)
Dept: MEDICAL GROUP | Facility: PHYSICIAN GROUP | Age: 69
End: 2019-04-11
Payer: MEDICARE

## 2019-04-11 VITALS
TEMPERATURE: 97.2 F | RESPIRATION RATE: 12 BRPM | SYSTOLIC BLOOD PRESSURE: 124 MMHG | HEART RATE: 66 BPM | OXYGEN SATURATION: 99 % | WEIGHT: 123 LBS | HEIGHT: 62 IN | DIASTOLIC BLOOD PRESSURE: 78 MMHG | BODY MASS INDEX: 22.63 KG/M2

## 2019-04-11 DIAGNOSIS — Z78.9 POLST (PHYSICIAN ORDERS FOR LIFE-SUSTAINING TREATMENT): ICD-10-CM

## 2019-04-11 DIAGNOSIS — M71.22 BAKER'S CYST OF KNEE, LEFT: ICD-10-CM

## 2019-04-11 PROCEDURE — 99214 OFFICE O/P EST MOD 30 MIN: CPT | Performed by: FAMILY MEDICINE

## 2019-04-11 NOTE — PROGRESS NOTES
Chief Complaint   Patient presents with   • Advance Care Planning       HISTORY OF PRESENT ILLNESS: Patient is a 68 y.o. female established patient here today for the following concerns:    Here today at the request of their  to have POLST form in additional to their advanced directive.  At this time Jon reports good health and wishes to pursue full medical treatment in the event of Cardiopulmonary arrest.  She is willing to undergo all testing, medications, IVs, ICU stay, and even feeding tube at this time.      In addition, Jon reports her left knee has really not improved much at all.  She continues with posterior swelling due to bakers cyst identified on MRI previously . She is ready to consider orthopedic eval as it is limiting her more and more. Taking ibuprofen more regularly to help with pain.        Past Medical, Social, and Family history reviewed and updated in EPIC    Allergies:Ceftin [cefuroxime axetil]    Current Outpatient Prescriptions   Medication Sig Dispense Refill   • fluticasone (FLONASE) 50 MCG/ACT nasal spray Spray 2 Sprays in nose every day. Each Nostril 16 g 11   • estradiol (ESTRACE) 0.5 MG tablet Take 1 Tab by mouth every day. 90 Tab 1   • valACYclovir (VALTREX) 500 MG Tab Take 1 Tab by mouth 2 times a day. 40 Tab 2   • BIOTIN PO Take  by mouth.     • Probiotic Product (PROBIOTIC PO) Take  by mouth.     • cyclobenzaprine (FLEXERIL) 5 MG tablet Take 1-2 Tabs by mouth 3 times a day as needed for Muscle Spasms. 90 Tab 3   • Multiple Vitamins-Minerals (MULTIVITAMIN PO) Take  by mouth.     • CALCIUM PO Take  by mouth.     • ibuprofen (MOTRIN) 800 MG Tab Take 1 Tab by mouth every 8 hours as needed for Moderate Pain (with food). 90 Tab 0   • vitamin D (CHOLECALCIFEROL) 1000 UNIT Tab Take 2,000 Units by mouth every day.       No current facility-administered medications for this visit.          ROS:  Review of Systems   Constitutional: Negative for fever, chills, weight loss and  "malaise/fatigue.   HENT: Negative for ear pain, nosebleeds, congestion, sore throat and neck pain.    Eyes: Negative for blurred vision.   Respiratory: Negative for cough, sputum production, shortness of breath and wheezing.    Cardiovascular: Negative for chest pain, palpitations,  and leg swelling.   Gastrointestinal: Negative for heartburn, nausea, vomiting, diarrhea and abdominal pain.   Genitourinary: Negative for dysuria, urgency and frequency.   Musculoskeletal: Negative for myalgias, back pain and + joint pain.   Skin: Negative for rash and itching.   Neurological: Negative for dizziness, tingling, tremors, sensory change, focal weakness and headaches.   Endo/Heme/Allergies: Does not bruise/bleed easily.   Psychiatric/Behavioral: Negative for depression, anxiety, suicidal ideas, insomnia and memory loss.      Exam:  /78   Pulse 66   Temp 36.2 °C (97.2 °F)   Resp 12   Ht 1.575 m (5' 2.01\")   Wt 55.8 kg (123 lb)   SpO2 99%     General:  Well nourished, well developed in NAD  Head is grossly normal.  Neck: Supple without JVD   Pulmonary:  Normal effort.   Cardiovascular: Regular rate  Extremities: no clubbing, cyanosis, or edema.  Psych: affect appropriate      Please note that this dictation was created using voice recognition software. I have made every reasonable attempt to correct obvious errors, but I expect that there are errors of grammar and possibly content that I did not discover before finalizing the note.    Assessment/Plan:  1. POLST (Physician Orders for Life-Sustaining Treatment)  Completed per patient wishes, explained that her POLST should be re-evaluated every few years and in the event of new health problems.      2. Baker's cyst of knee, left  - REFERRAL TO ORTHOPEDICS    Follow up as needed.         "

## 2019-04-16 ENCOUNTER — OFFICE VISIT (OUTPATIENT)
Dept: PHYSICAL MEDICINE AND REHAB | Facility: MEDICAL CENTER | Age: 69
End: 2019-04-16
Payer: MEDICARE

## 2019-04-16 VITALS
HEIGHT: 62 IN | OXYGEN SATURATION: 97 % | HEART RATE: 90 BPM | BODY MASS INDEX: 22.88 KG/M2 | DIASTOLIC BLOOD PRESSURE: 68 MMHG | TEMPERATURE: 98.9 F | SYSTOLIC BLOOD PRESSURE: 128 MMHG | WEIGHT: 124.34 LBS

## 2019-04-16 DIAGNOSIS — G56.01 RIGHT CARPAL TUNNEL SYNDROME: ICD-10-CM

## 2019-04-16 DIAGNOSIS — M17.10 ARTHRITIS OF KNEE: ICD-10-CM

## 2019-04-16 DIAGNOSIS — M50.30 DDD (DEGENERATIVE DISC DISEASE), CERVICAL: ICD-10-CM

## 2019-04-16 DIAGNOSIS — M71.22 BAKER CYST, LEFT: ICD-10-CM

## 2019-04-16 DIAGNOSIS — M47.812 SPONDYLOSIS OF CERVICAL REGION WITHOUT MYELOPATHY OR RADICULOPATHY: ICD-10-CM

## 2019-04-16 PROCEDURE — 76882 US LMTD JT/FCL EVL NVASC XTR: CPT | Performed by: PHYSICAL MEDICINE & REHABILITATION

## 2019-04-16 PROCEDURE — 99205 OFFICE O/P NEW HI 60 MIN: CPT | Mod: 25 | Performed by: PHYSICAL MEDICINE & REHABILITATION

## 2019-04-16 ASSESSMENT — PATIENT HEALTH QUESTIONNAIRE - PHQ9: CLINICAL INTERPRETATION OF PHQ2 SCORE: 0

## 2019-04-16 NOTE — PATIENT INSTRUCTIONS
Your procedure will be at the [facility] special procedure suite.    [facility] [place] [address]       PRE-PROCEDURE INSTRUCTIONS  You may take your regular medications except:   · No Anti-inflammatories 5 days prior to your procedure. Anti-inflammatories include medicines such as  ibuprofen (Motrin, Advil), Excedrin, Naproxen (Aleve, Anaprox, Naprelan, Naprosyn), Celecoxib (Celebrex), Diclofenac (Voltaren-XR tab), and Meloxicam (Mobic).   · No Glucophage or Metformin 24 hours before your procedure. You may resume next day after your procedure.  · Call the physiatry office if you are taking or prescribed anti-biotics within five days of procedure.  · Please ask provider if you are taking any new diabetes medication.  · CONTINUE TAKING BLOOD PRESSURE MEDICATIONS AS PRESCRIBED.  · Pain medications will not be prescribed on the procedure day. Procedural pain medication may be used by your provider   · Call your doctor's office performing the procedure if you have a fever, chills, rash or new illness prior to your procedure    Anticoagulation/antiplatelet medications  No Blood thinning medications such as Coumadin or Plavix 5 days prior to procedure unless your doctor said to continue these medications. Call your doctor if a new medication is prescribed in this class.     Restrictions for eating before procedure:   · If you are getting procedural sedation, then do not eat to for 8 hours prior to procedure appointment time. Do not drink fluids for four hours prior to your procedure time.   · If you are not having procedural sedation, then Skip the meal prior to your procedure. If you have a morning procedure then skip breakfast. If you have an afternoon procedure then skip lunch.   · You may drink clear liquids up to 2 hours prior to your procedure  · You must have a  the day of procedure to accompany you home.      POST PROCEDURE INSTRUCTIONS   · No heavy lifting, strenuous bending or  strenuous exercise for 3 days after your procedure.  · No hot tubs, baths, swimming for 3 days after your procedure  · You can remove the bandage the day after the procedure.  · If you received a diagnostic procedure (such as a medial branch block), please note that we do expect this injection to wear off. It is important to complete the pain diary and list the pain score only for the region where the procedure was and bring this to your follow up visit.   · Your leg may feel heavy, weak and numb for up to 1-2 days. Be very careful walking.   ·  You may resume normal activities 3 days after procedure.

## 2019-04-16 NOTE — PROGRESS NOTES
New patient note    Physiatry (physical medicine and  Rehabilitation), interventional spine and sports medicine    Date of Service: 4/16/2019    Chief complaint: neck pain    HISTORY    HPI: Jon Garcia 68 y.o. female who presents today with Diagnoses of Right carpal tunnel syndrome, Spondylosis of cervical region without myelopathy or radiculopathy, DDD (degenerative disc disease), cervical, Baker cyst, left, and Arthritis of left knee were pertinent to this visit.    Chronic left knee pain, posterior bakers cyst which flares up at times, also with pain in the medial aspect of the knee. No pain in the knee at rest. Sometimes there is aching medial and posterior moderate pain with stiffness.     Chronic right sided neck pain and associated right scapular pain, aching and sharp in quality, difficult for the patient to tell if this is radiating, present for over the past decade. She has been to greater than 6 weeks of PT, done massage therapy with mild improvement. Tried flexeril and ibuprofen 800mg with only mild improvement. Associated right hand numbness in the first three digits worse in the morning.     Psychological testing for pain as depression and pain commonly coexist and need to both be treated.     PHQ  Depression Screen (PHQ-2/PHQ-9) 1/2/2018 3/14/2019 4/16/2019   PHQ-2 Total Score - - -   PHQ-2 Total Score 0 0 0       Interpretation of PHQ-9 Total Score   Score Severity   1-4 No Depression   5-9 Mild Depression   10-14 Moderate Depression   15-19 Moderately Severe Depression   20-27 Severe Depression     Medical records review:  I reviewed the note from the referring provider Bertha Gómez M.D. dated 4/11/2019.  Painful Baker's cyst of the left knee, referred to orthopedics.  Chronic left knee pain, worse in the medial aspect.      ROS:   Red Flags ROS:   Fever, Chills, Sweats: Denies  Involuntary Weight Loss: Denies  Bladder Incontinence: Denies  Bowel Incontinence: denies  Saddle Anesthesia:  Denies    All other systems reviewed and negative.       PMHx:   History reviewed. No pertinent past medical history.      Current Outpatient Prescriptions on File Prior to Visit   Medication Sig Dispense Refill   • fluticasone (FLONASE) 50 MCG/ACT nasal spray Spray 2 Sprays in nose every day. Each Nostril 16 g 11   • estradiol (ESTRACE) 0.5 MG tablet Take 1 Tab by mouth every day. 90 Tab 1   • BIOTIN PO Take  by mouth.     • Multiple Vitamins-Minerals (MULTIVITAMIN PO) Take  by mouth.     • CALCIUM PO Take  by mouth.     • vitamin D (CHOLECALCIFEROL) 1000 UNIT Tab Take 2,000 Units by mouth every day.     • valACYclovir (VALTREX) 500 MG Tab Take 1 Tab by mouth 2 times a day. 40 Tab 2   • Probiotic Product (PROBIOTIC PO) Take  by mouth.     • cyclobenzaprine (FLEXERIL) 5 MG tablet Take 1-2 Tabs by mouth 3 times a day as needed for Muscle Spasms. 90 Tab 3   • ibuprofen (MOTRIN) 800 MG Tab Take 1 Tab by mouth every 8 hours as needed for Moderate Pain (with food). 90 Tab 0     No current facility-administered medications on file prior to visit.         PSHx:   Past Surgical History:   Procedure Laterality Date   • HYSTERECTOMY, TOTAL ABDOMINAL  7/2002    Still has cervix. Due to fibroids   • HERNIA REPAIR     • TONSILLECTOMY      Child       Family history   Family History   Problem Relation Age of Onset   • Stroke Mother    • Hypertension Mother    • Other Father         glaucoma   • Arthritis Father         knee/hip replacement   • Hypertension Brother    • Cancer Brother 54        Pancreatic   • Hypertension Brother    • Lung Disease Brother         asthma   • Hypertension Brother    • Arthritis Brother         knee replacement         Medications: reviewed on [facility].   Outpatient Prescriptions Marked as Taking for the 4/16/19 encounter (Office Visit) with Wallace Dejesus M.D.   Medication Sig Dispense Refill   • fluticasone (FLONASE) 50 MCG/ACT nasal spray Spray 2 Sprays in nose every day. Each Nostril 16 g 11   •  "estradiol (ESTRACE) 0.5 MG tablet Take 1 Tab by mouth every day. 90 Tab 1   • BIOTIN PO Take  by mouth.     • Multiple Vitamins-Minerals (MULTIVITAMIN PO) Take  by mouth.     • CALCIUM PO Take  by mouth.     • vitamin D (CHOLECALCIFEROL) 1000 UNIT Tab Take 2,000 Units by mouth every day.          Allergies:   Allergies   Allergen Reactions   • Ceftin [Cefuroxime Axetil]      Pruritis       Social Hx:   Social History     Social History   • Marital status:      Spouse name: N/A   • Number of children: N/A   • Years of education: N/A     Occupational History   • Not on file.     Social History Main Topics   • Smoking status: Former Smoker     Packs/day: 0.50     Years: 12.00     Types: Cigarettes     Quit date: 10/7/1982   • Smokeless tobacco: Never Used      Comment: Started in 1970   • Alcohol use 0.6 oz/week     1 Glasses of wine per week      Comment: 1-2 wine with dinner 2-3 times a week.   • Drug use: No      Comment: As youth   • Sexual activity: Yes     Partners: Male     Birth control/ protection: Post-Menopausal     Other Topics Concern   •  Service No   • Blood Transfusions No   • Caffeine Concern No   • Occupational Exposure No   • Hobby Hazards No   • Sleep Concern No   • Stress Concern No   • Weight Concern No   • Special Diet No   • Back Care No   • Exercise Yes   • Bike Helmet No   • Seat Belt Yes   • Self-Exams Yes     Social History Narrative   • No narrative on file         EXAMINATION     Physical Exam:   Vitals: /68 (BP Location: Left arm, Patient Position: Sitting, BP Cuff Size: Adult)   Pulse 90   Temp 37.2 °C (98.9 °F) (Temporal)   Ht 1.575 m (5' 2\")   Wt 56.4 kg (124 lb 5.4 oz)   SpO2 97%     Constitutional:   Body Habitus: Body mass index is 22.74 kg/m².  Cooperation: Fully cooperates with exam  Appearance: Well-groomed, well-nourished, not disheveled     Eyes: No scleral icterus to suggest severe liver disease, no proptosis to suggest severe hyperthyroid    ENT -no " obvious auditory deficits, no obvious tongue lesions, tongue midline, no facial droop     Skin -no rashes or lesions noted     Respiratory-  breathing comfortable on room air, no audible wheezing    Cardiovascular- capillary refills less than 2 seconds. No lower extremity edema is noted.     Gastrointestinal - no obvious abdominal masses, No tenderness to palpation in the abdomen    Psychiatric- alert and oriented ×3. Normal affect.     Gait - normal gait, no use of ambulatory device, nonantalgic. the patient can toe walk with ease. the patient can heel walk with ease..     Musculoskeletal -   Bilateral hands:   Inspection: No swelling,  Deformities or rashes. Symmetric appearing thenar and hyperthenar regions bilaterally.  Palpation no significant tenderness to palpation throughout the bilateral hands  Range of motion is within normal limits throughout bilateral hands, fingers and wrist.  Special tests:  Carpal tunnel compression: positive right, negative left  Phalen's test: positive right, negative left  Finkelstein's test: Negative bilaterally   CMC grind mildly positive right   Abductor pollicis brevis strength 4/5 on the right, 5/5 in left    Cervical spine   Inspection: No deformities of the skin over the cervical spine. No rashes or lesions.    decreased A/P ROM in all directions, with  pain      Spurling’s sign: negative bilaterally    No signs of muscular atrophy in bilateral upper extremities     Positive right mid tenderness to palpation of the cervical facets          Neuro       Key points for the international standards for neurological classification of spinal cord injury (ISNCSCI) to light touch.     Dermatome R L   C4 2 2   C5 2 2   C6 1 2   C7 1 2   C8 2 2   T1 2 2   T2 2 2                                       Motor Exam Upper Extremities   ? Myotome R L   Shoulder flexion C5 5 5   Elbow flexion C5 5 5   Wrist extension C6 5 5   Elbow extension C7 5 5   Finger flexion C8 5 5   Finger abduction T1  5 5         Ramos’s sign negative bilaterally     Reflexes  ?  R L   Biceps  2+ 2+   Brachioradialis  2+ 2+                       MEDICAL DECISION MAKING    Medical records review: see under HPI section.     DATA    Labs:   Lab Results   Component Value Date/Time    SODIUM 139 03/13/2018 10:02 AM    POTASSIUM 4.5 03/13/2018 10:02 AM    CHLORIDE 106 03/13/2018 10:02 AM    CO2 26 03/13/2018 10:02 AM    ANION 7.0 03/13/2018 10:02 AM    GLUCOSE 90 03/13/2018 10:02 AM    BUN 18 03/13/2018 10:02 AM    CREATININE 0.72 03/13/2018 10:02 AM    CALCIUM 10.1 03/13/2018 10:02 AM    ASTSGOT 17 03/13/2018 10:02 AM    ALTSGPT 16 03/13/2018 10:02 AM    TBILIRUBIN 0.6 03/13/2018 10:02 AM    ALBUMIN 4.0 03/13/2018 10:02 AM    TOTPROTEIN 7.4 03/13/2018 10:02 AM    GLOBULIN 3.4 03/13/2018 10:02 AM    AGRATIO 1.2 03/13/2018 10:02 AM   ]    No results found for: PROTHROMBTM, INR     No results found for: WBC, RBC, HEMOGLOBIN, HEMATOCRIT, MCV, MCH, MCHC, MPV, NEUTSPOLYS, LYMPHOCYTES, MONOCYTES, EOSINOPHILS, BASOPHILS, HYPOCHROMIA, ANISOCYTOSIS     No results found for: HBA1C     Imaging:   I personally reviewed following images, these are my reads  MRI left knee 5/10/2018  Large knee effusion, large Baker's cyst, severe patellofemoral joint arthritis.    IMAGING radiology reads. I reviewed the following radiology reads                                   Results for orders placed during the hospital encounter of 03/25/19   MR-CERVICAL SPINE-W/O    Impression 1.  Multilevel degenerative disc disease and facet degeneration. No central canal narrowing. There is mild left neural foraminal narrowing at C4-5 and C6-7.    2.  Mild kyphotic deformity.    3.  Mild multilevel degenerative subluxation.                                                                MRI left knee 5/10/2018  1.  Large multiloculated Baker's cyst with fluid extending inferiorly superficial to the medial head of the gastrocnemius muscle. Findings are consistent with cyst  rupture.    2.  Moderate to large knee effusion.    3.  Severe chondromalacia patella. Full-thickness articular cartilage loss in the lateral patellar facet and opposing trochlea with mild subchondral edema and lateral tilt of the patella. Edema in the superolateral aspect of Hoffa's fat pad. 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    4.  Minimal degenerative change in the medial and lateral compartments.                                                                                                          Diagnosis   Visit Diagnoses     ICD-10-CM   1. Right carpal tunnel syndrome G56.01   2. Spondylosis of cervical region without myelopathy or radiculopathy M47.812   3. DDD (degenerative disc disease), cervical M50.30   4. Crocker cyst, left M71.22   5. Arthritis of left knee M17.10           ASSESSMENT AND PLAN:  Jon Garcia 68 y.o. female      Diagnoses and all orders for this visit:    Right carpal tunnel syndrome  Comments:  I provided the patient with a neutral wrist splint and advised to wear this nightly.  Consider carpal tunnel injection if she fails splinting    Spondylosis of cervical region without myelopathy or radiculopathy  Comments:  Start with physical therapy to improve strength, range of motion and stability.  Consider medial branch blocks if she fails PT  Orders:  -     Cancel: REFERRAL TO PHYSICIAL MEDICINE REHAB  -     REFERRAL TO PHYSICIAL MEDICINE REHAB    DDD (degenerative disc disease), cervical    Arthritis of left knee  Comments:  Patient has follow-up with [facility] for evaluation.  Consider injection/aspiration if she does not wish to proceed with surgery    Baker cyst, left  Comments:  Consider aspiration if she does not want to proceed with surgery        I performed a limited diagnostic ultrasound of the RIGHT median nerve which shows a cross-sectional area of 14 mm² at the level of the carpal tunnel outlet and 8 mm² at the level  of the pronator quadratus.  The images were uploaded to the medical record.  This is consistent with carpal tunnel syndrome.    Follow-up: 5/29/2019       Please note that this dictation was created using voice recognition software. I have made every reasonable attempt to correct obvious errors but there may be errors of grammar and content that I may have overlooked prior to finalization of this note.      Wallace Dejesus MD  Physical Medicine and Rehabilitation  Interventional Spine and Sports Physiatry  [facility]               Bertha De Paz M.D.

## 2019-04-16 NOTE — Clinical Note
Dear Bertha Gómez M.D. ,   Thank you for the referral of Jon Garcia.  Please see my note for more details    Should you have any questions or concerns please do not hesitate to contact me.  Wallace Dejesus M.D.

## 2019-04-26 ENCOUNTER — PHYSICAL THERAPY (OUTPATIENT)
Dept: PHYSICAL THERAPY | Facility: REHABILITATION | Age: 69
End: 2019-04-26
Attending: FAMILY MEDICINE
Payer: MEDICARE

## 2019-04-26 DIAGNOSIS — M25.562 CHRONIC PAIN OF LEFT KNEE: ICD-10-CM

## 2019-04-26 DIAGNOSIS — G89.29 CHRONIC PAIN OF LEFT KNEE: ICD-10-CM

## 2019-04-26 PROCEDURE — 97161 PT EVAL LOW COMPLEX 20 MIN: CPT

## 2019-04-26 PROCEDURE — 97110 THERAPEUTIC EXERCISES: CPT

## 2019-04-26 ASSESSMENT — ENCOUNTER SYMPTOMS
PAIN SCALE AT HIGHEST: 2
PAIN SCALE AT LOWEST: 0
QUALITY: SHOOTING
PAIN SCALE: 0
QUALITY: SHARP

## 2019-04-26 NOTE — OP THERAPY EVALUATION
Outpatient Physical Therapy  INITIAL EVALUATION    [facility] [place]  [address], [address]  [place] [address]  Phone:  [phone]  Fax:  [phone]    Date of Evaluation: 2019    Patient: Jon Garcia  YOB: 1950  MRN: [id number]     Referring Provider: Bertha Gómez M.D.  [address]  [place] [address]   Referring Diagnosis Chronic pain of left knee [M25.562, G89.29]     Time Calculation  Start time: 1115  Stop time: 1215 Time Calculation (min): 60 minutes     Physical Therapy Occurrence Codes    Date of onset of impairment:  18   Date physical therapy care plan established or reviewed:  19   Date physical therapy treatment started:  19          Chief Complaint: Knee Problem    Visit Diagnoses     ICD-10-CM   1. Chronic pain of left knee M25.562    G89.29       Subjective:   History of Present Illness:     Mechanism of injury:  Jon Garcia is a 68 y.o. female that presents to therapy with medial L knee pain. She has recently had PT last year for bakers cyst treatment where her pain reduced overall but she still gets intermittent swelling and pain several times a month from bakers cyst issue. Her medial knee pain Is exacerbated without known cause. Patient denies fevers/chills, numbness/weakness of lower extremities, bowel/bladder incontinence, saddle anesthesia. She plans on seeing an orthopedist in a few weeks to seek surgical intervention for bakers cyst issue.    Aggravating factors: lying with knees together. Turning on the knee. Pain with bending knee all the way.   Relieving factors: rest, elevation, ice, having foot hang off of bed at an angle    ADL limitations: none, can walk as much as she wants to. Sometimes it hurts but doesn't stop her.     Pain:     Current pain ratin    At best pain ratin    At worst pain ratin    Quality:  Shooting and sharp      History reviewed. No pertinent past medical  history.  Past Surgical History:   Procedure Laterality Date   • HYSTERECTOMY, TOTAL ABDOMINAL  7/2002    Still has cervix. Due to fibroids   • HERNIA REPAIR     • TONSILLECTOMY      Child     Social History   Substance Use Topics   • Smoking status: Former Smoker     Packs/day: 0.50     Years: 12.00     Types: Cigarettes     Quit date: 10/7/1982   • Smokeless tobacco: Never Used      Comment: Started in 1970   • Alcohol use 0.6 oz/week     1 Glasses of wine per week      Comment: 1-2 wine with dinner 2-3 times a week.     Family and Occupational History     Social History   • Marital status:      Spouse name: N/A   • Number of children: N/A   • Years of education: N/A       Objective     Tenderness   Left Knee   Tenderness in the medial joint line.     Active Range of Motion   Left Knee   Flexion: 135 degrees   Extensor lag: 3 degrees     Right Knee   Flexion: 135 degrees   Extension: 0 degrees     Passive Range of Motion   Left Knee   Flexion: 140 degrees WFL and with pain  Extension: 0 degrees WFL and with pain    Right Knee   Flexion: 145 degrees   Extension: 0 degrees     Patellar Static Positioning   Left Knee: WFL  Right Knee: WFL    Strength:      Left Knee   Normal strength    Tests     Left Knee   Positive Apley's compression and Thessaly's test at 5 degrees.   Negative lateral Ruth, medial Ruth, valgus stress test at 0 degrees, valgus stress test at 30 degrees, varus stress test at 0 degrees and varus stress test at 30 degrees.       Therapeutic Exercises (CPT 86510):       Therapeutic Exercise Summary: HEP instruction/performance and development. Handout provided for knee extension stretching with calf on pillow along with quad sets. Pt demonstrated previous HEP including crab walk, SLR, hip abduction, bridges which are encouraged to continue.        Time-based treatments/modalities:  Therapeutic exercise minutes (CPT 04601): 10 minutes       Assessment, Response and Plan:   Assessment  details:  Jon Garcia is a 68 y.o. female with signs and symptoms consistent with Medial meniscus pathology vs chronic inflammation issue from bakers cyst. As she desires surgical intervention for long term problem that she has already undergone months of physical therapy for, therapy benefits will be preserved until after surgery. Pt will be discharged at this time and will f/u with new referral from orthopedist if indicated.     Plan:   Therapy options:  Referred to other specialist    Functional Limitations and Severity Modifiers  PT Functional Assessment Tool Used: LEFS  PT Functional Assessment Score: 62/80     Referring provider co-signature:  I have reviewed this plan of care and my co-signature certifies the need for services.  Certification Dates:   From 04/26/19     To 4/26/19    Physician Signature: ________________________________ Date: ______________

## 2019-04-30 ENCOUNTER — APPOINTMENT (OUTPATIENT)
Dept: PHYSICAL THERAPY | Facility: REHABILITATION | Age: 69
End: 2019-04-30
Attending: FAMILY MEDICINE
Payer: MEDICARE

## 2019-05-02 ENCOUNTER — APPOINTMENT (OUTPATIENT)
Dept: PHYSICAL THERAPY | Facility: REHABILITATION | Age: 69
End: 2019-05-02
Attending: FAMILY MEDICINE
Payer: MEDICARE

## 2019-05-07 ENCOUNTER — APPOINTMENT (OUTPATIENT)
Dept: PHYSICAL THERAPY | Facility: REHABILITATION | Age: 69
End: 2019-05-07
Attending: FAMILY MEDICINE
Payer: MEDICARE

## 2019-05-09 ENCOUNTER — APPOINTMENT (OUTPATIENT)
Dept: PHYSICAL THERAPY | Facility: REHABILITATION | Age: 69
End: 2019-05-09
Attending: FAMILY MEDICINE
Payer: MEDICARE

## 2019-05-13 ENCOUNTER — HOSPITAL ENCOUNTER (OUTPATIENT)
Dept: PAIN MANAGEMENT | Facility: REHABILITATION | Age: 69
End: 2019-05-13
Attending: PHYSICAL MEDICINE & REHABILITATION
Payer: MEDICARE

## 2019-05-13 ENCOUNTER — HOSPITAL ENCOUNTER (OUTPATIENT)
Dept: RADIOLOGY | Facility: REHABILITATION | Age: 69
End: 2019-05-13
Attending: PHYSICAL MEDICINE & REHABILITATION

## 2019-05-13 VITALS
RESPIRATION RATE: 16 BRPM | HEART RATE: 70 BPM | OXYGEN SATURATION: 98 % | SYSTOLIC BLOOD PRESSURE: 132 MMHG | TEMPERATURE: 97.9 F | DIASTOLIC BLOOD PRESSURE: 69 MMHG | WEIGHT: 117.73 LBS | HEIGHT: 62 IN | BODY MASS INDEX: 21.66 KG/M2

## 2019-05-13 PROCEDURE — 700117 HCHG RX CONTRAST REV CODE 255

## 2019-05-13 PROCEDURE — 700101 HCHG RX REV CODE 250

## 2019-05-13 PROCEDURE — 64491 INJ PARAVERT F JNT C/T 2 LEV: CPT

## 2019-05-13 PROCEDURE — 64492 INJ PARAVERT F JNT C/T 3 LEV: CPT

## 2019-05-13 PROCEDURE — 99152 MOD SED SAME PHYS/QHP 5/>YRS: CPT

## 2019-05-13 PROCEDURE — 700111 HCHG RX REV CODE 636 W/ 250 OVERRIDE (IP)

## 2019-05-13 PROCEDURE — 64490 INJ PARAVERT F JNT C/T 1 LEV: CPT

## 2019-05-13 RX ORDER — LIDOCAINE HYDROCHLORIDE 20 MG/ML
INJECTION, SOLUTION EPIDURAL; INFILTRATION; INTRACAUDAL; PERINEURAL
Status: COMPLETED
Start: 2019-05-13 | End: 2019-05-13

## 2019-05-13 RX ORDER — MIDAZOLAM HYDROCHLORIDE 1 MG/ML
INJECTION INTRAMUSCULAR; INTRAVENOUS
Status: COMPLETED
Start: 2019-05-13 | End: 2019-05-13

## 2019-05-13 RX ORDER — LIDOCAINE HYDROCHLORIDE 10 MG/ML
INJECTION, SOLUTION EPIDURAL; INFILTRATION; INTRACAUDAL; PERINEURAL
Status: COMPLETED
Start: 2019-05-13 | End: 2019-05-13

## 2019-05-13 RX ADMIN — LIDOCAINE HYDROCHLORIDE 5 ML: 20 INJECTION, SOLUTION EPIDURAL; INFILTRATION; INTRACAUDAL; PERINEURAL at 17:15

## 2019-05-13 RX ADMIN — IOHEXOL 3 ML: 240 INJECTION, SOLUTION INTRATHECAL; INTRAVASCULAR; INTRAVENOUS; ORAL at 17:13

## 2019-05-13 RX ADMIN — LIDOCAINE HYDROCHLORIDE 10 ML: 10 INJECTION, SOLUTION EPIDURAL; INFILTRATION; INTRACAUDAL; PERINEURAL at 17:09

## 2019-05-13 RX ADMIN — MIDAZOLAM HYDROCHLORIDE 1 MG: 1 INJECTION, SOLUTION INTRAMUSCULAR; INTRAVENOUS at 16:55

## 2019-05-13 NOTE — NON-PROVIDER
Current medications reviewed with pt, see medications reconciliation form. Pt tala taking ASA or other blood thinners or anti-inflammatories.  Pt has a ride post-procedure(Rod to drive).  Printed and verbal discharge instructions given to pt who verbalized understanding.

## 2019-05-14 ENCOUNTER — APPOINTMENT (OUTPATIENT)
Dept: PHYSICAL THERAPY | Facility: REHABILITATION | Age: 69
End: 2019-05-14
Attending: FAMILY MEDICINE
Payer: MEDICARE

## 2019-05-14 NOTE — NON-PROVIDER
1655 PM    .TIMEOUT:Patient identified,procedure confirmed, medication allergies, pertinent medical history , significant patient information ( stop bang score #   ). Site marked by Dr. Dejesus . Positioned patient by RN,CST, & X-ray Tech.

## 2019-05-14 NOTE — PROCEDURES
Date of Service: see epic time stamp for DOS    Patient: Jon Garcia 68 y.o. female     MRN: [id number]     Physician/s: Wallace Dejesus MD    Pre-operative Diagnosis: Cervical spondylosis, facet arthropathy    Post-operative Diagnosis: Cervical spondylosis, facet arthropathy    Procedure: RIGHT C3-4, C4-5 and C5-6 Medial Branch Block with sedation Versed only    Description of procedure:    The risks, benefits, and alternatives of the procedure were reviewed and discussed with the patient.  Written informed consent was freely obtained. A pre-procedural time-out was conducted by the physician verifying patient’s identity, procedure to be performed, procedure site and side, and allergy verification. Appropriate equipment was determined to be in place for the procedure.     Moderation sedation was achieved with Versed (1mg). Monitoring of the patients vital signs and respiratory status was provided by trained independent registered nurse during the entire course of the procedures and under my supervision and recoded in the patient’s medical record. The duration of sedation was over 10 minutes. }    The patient's vital signs were carefully monitored before, throughout, and after the procedure.     In the fluoroscopy suite the patient was placed in a prone position, a pillow placed underneath their chest, and the head was turned to the opposite side of injection. The skin was prepped and draped in the usual sterile fashion.  The C3-4 facet joint with medial branch runs was not easily seen.  I then changed to a lateral approach with the right side up.  The patient was reprepped with usual sterile fashion.  The fluoroscope was placed over the cervical neck at the appropriate injection angles, and the targets for injection were marked at the  Levels C3, C4, C5, C6 on the right side.  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 A 27g 1.5'' needle was placed into each of the markings levels as above, and approx 0.5mL of  1% Lidocaine was injected subcutaneously into the epidermal and dermal layers. The needles were removed. A 25g 3.5'' needle was then placed at the towards the center of the articular pillars where the medial branch runs at the C3, C4, C5, C6 levels on the right side.  This was confirmed with AP and lateral oblique views. The needle tips were then verified by AP, contralateral oblique, and lateral views. In the AP view, less than 0.5 mL of contrast dye was used to highlight the medial branch while the fluoroscope was running live. Following negative aspiration, approx 0.3mL 2% lidocaine was then injected at the above levels, and the needles were removed intact after restyleted. The patient's back was covered with a 4x4 gauze, the area was cleansed with sterile normal saline, and a dressing was applied.     There were no complications noted, and patient was hemodynamically stable before and after the procedure.     The patient had 80% pain relief when turning the head to the right post procedure when compared to preprocedure.    Follow-up visit is scheduled for 5/29/2019     Wallace Dejesus MD  Physical Medicine and Rehabilitation  Interventional Spine and Sports Physiatry  [facility]          CPT  Intraarticular joint or medial branch block (MBB) - cervical or thoracic (1st level):  63530  Intraarticular joint or medial branch block (MBB) - cervical or thoracic (2nd level):  61751  Intraarticular joint or medial branch block (MBB) - cervical or thoracic (3rd level):  61599  moderate procedural sedation first 15 minutes: [id number]

## 2019-05-15 ENCOUNTER — TELEPHONE (OUTPATIENT)
Dept: PHYSICAL MEDICINE AND REHAB | Facility: MEDICAL CENTER | Age: 69
End: 2019-05-15

## 2019-05-15 NOTE — TELEPHONE ENCOUNTER
Spoke to Pt in regards to her Special procedure that was done 05/13/19 w/ Dr. Dejesus and she mentioned that she is doing fine.      Thank you  Aleisha

## 2019-05-16 ENCOUNTER — APPOINTMENT (OUTPATIENT)
Dept: PHYSICAL THERAPY | Facility: REHABILITATION | Age: 69
End: 2019-05-16
Attending: FAMILY MEDICINE
Payer: MEDICARE

## 2019-05-21 ENCOUNTER — APPOINTMENT (OUTPATIENT)
Dept: PHYSICAL THERAPY | Facility: REHABILITATION | Age: 69
End: 2019-05-21
Attending: FAMILY MEDICINE
Payer: MEDICARE

## 2019-05-23 ENCOUNTER — APPOINTMENT (OUTPATIENT)
Dept: PHYSICAL THERAPY | Facility: REHABILITATION | Age: 69
End: 2019-05-23
Attending: FAMILY MEDICINE
Payer: MEDICARE

## 2019-05-28 ENCOUNTER — APPOINTMENT (OUTPATIENT)
Dept: PHYSICAL THERAPY | Facility: REHABILITATION | Age: 69
End: 2019-05-28
Attending: FAMILY MEDICINE
Payer: MEDICARE

## 2019-05-29 ENCOUNTER — APPOINTMENT (OUTPATIENT)
Dept: PHYSICAL MEDICINE AND REHAB | Facility: MEDICAL CENTER | Age: 69
End: 2019-05-29
Payer: MEDICARE

## 2019-05-30 ENCOUNTER — APPOINTMENT (OUTPATIENT)
Dept: PHYSICAL THERAPY | Facility: REHABILITATION | Age: 69
End: 2019-05-30
Attending: FAMILY MEDICINE
Payer: MEDICARE

## 2019-05-30 ENCOUNTER — OFFICE VISIT (OUTPATIENT)
Dept: PHYSICAL MEDICINE AND REHAB | Facility: MEDICAL CENTER | Age: 69
End: 2019-05-30
Payer: MEDICARE

## 2019-05-30 VITALS
BODY MASS INDEX: 22.48 KG/M2 | TEMPERATURE: 98.5 F | HEART RATE: 80 BPM | HEIGHT: 62 IN | SYSTOLIC BLOOD PRESSURE: 124 MMHG | DIASTOLIC BLOOD PRESSURE: 68 MMHG | OXYGEN SATURATION: 98 % | WEIGHT: 122.14 LBS

## 2019-05-30 DIAGNOSIS — M47.812 SPONDYLOSIS OF CERVICAL REGION WITHOUT MYELOPATHY OR RADICULOPATHY: ICD-10-CM

## 2019-05-30 DIAGNOSIS — M17.11 ARTHRITIS OF RIGHT KNEE: ICD-10-CM

## 2019-05-30 DIAGNOSIS — M50.30 DDD (DEGENERATIVE DISC DISEASE), CERVICAL: ICD-10-CM

## 2019-05-30 DIAGNOSIS — G56.01 RIGHT CARPAL TUNNEL SYNDROME: ICD-10-CM

## 2019-05-30 PROCEDURE — 99214 OFFICE O/P EST MOD 30 MIN: CPT | Performed by: PHYSICAL MEDICINE & REHABILITATION

## 2019-05-30 ASSESSMENT — PAIN SCALES - GENERAL: PAINLEVEL: 5=MODERATE PAIN

## 2019-05-30 NOTE — PROGRESS NOTES
Follow up patient note  Interventional spine and sports physiatry, Physical medicine rehabilitation      Chief complaint:   Chief Complaint   Patient presents with   • Follow-Up     Right carpal tunnel syndrome          HISTORY    Please see new patient note dated  by Dr Dejesus,  for more details.     HPI  Patient identification: Jon Garcia 68 y.o. female  With Diagnoses of Spondylosis of cervical region without myelopathy or radiculopathy, DDD (degenerative disc disease), cervical, Right carpal tunnel syndrome, and Arthritis of right knee were pertinent to this visit.       -The patient had a percent pain relief in her right-sided neck pain and right periscapular pain after the diagnostic cervical medial branch block.  After the first 8 hours the pain started to return back to its baseline and now it is back to its baseline which is moderate intensity, right-sided neck and periscapular pain, worse with neck movements, aching quality, constant.    For the patient's right carpal tunnel syndrome the numbness and tingling and burning sensation of the first 3 digits in the palmar aspect of the hand have not improved with using a neutral wrist splint.    Knee pain stable     ROS Red Flags :   Fever, Chills, Sweats: Denies  Involuntary Weight Loss: Denies  Bowel/Bladder Incontinence: Denies  Saddle Anesthesia: Denies        PMHx:   History reviewed. No pertinent past medical history.    PSHx:   Past Surgical History:   Procedure Laterality Date   • HYSTERECTOMY, TOTAL ABDOMINAL  7/2002    Still has cervix. Due to fibroids   • HERNIA REPAIR     • TONSILLECTOMY      Child       Family history   Family History   Problem Relation Age of Onset   • Stroke Mother    • Hypertension Mother    • Other Father         glaucoma   • Arthritis Father         knee/hip replacement   • Hypertension Brother    • Cancer Brother 54        Pancreatic   • Hypertension Brother    • Lung Disease Brother         asthma   • Hypertension Brother     • Arthritis Brother         knee replacement         Medications:   Outpatient Prescriptions Marked as Taking for the 5/30/19 encounter (Office Visit) with Wallace Dejesus M.D.   Medication Sig Dispense Refill   • fluticasone (FLONASE) 50 MCG/ACT nasal spray Spray 2 Sprays in nose every day. Each Nostril 16 g 11   • estradiol (ESTRACE) 0.5 MG tablet Take 1 Tab by mouth every day. 90 Tab 1   • valACYclovir (VALTREX) 500 MG Tab Take 1 Tab by mouth 2 times a day. 40 Tab 2   • BIOTIN PO Take  by mouth.     • Multiple Vitamins-Minerals (MULTIVITAMIN PO) Take  by mouth.     • CALCIUM PO Take  by mouth.     • ibuprofen (MOTRIN) 800 MG Tab Take 1 Tab by mouth every 8 hours as needed for Moderate Pain (with food). 90 Tab 0   • vitamin D (CHOLECALCIFEROL) 1000 UNIT Tab Take 2,000 Units by mouth every day.          Current Outpatient Prescriptions on File Prior to Visit   Medication Sig Dispense Refill   • fluticasone (FLONASE) 50 MCG/ACT nasal spray Spray 2 Sprays in nose every day. Each Nostril 16 g 11   • estradiol (ESTRACE) 0.5 MG tablet Take 1 Tab by mouth every day. 90 Tab 1   • valACYclovir (VALTREX) 500 MG Tab Take 1 Tab by mouth 2 times a day. 40 Tab 2   • BIOTIN PO Take  by mouth.     • Multiple Vitamins-Minerals (MULTIVITAMIN PO) Take  by mouth.     • CALCIUM PO Take  by mouth.     • ibuprofen (MOTRIN) 800 MG Tab Take 1 Tab by mouth every 8 hours as needed for Moderate Pain (with food). 90 Tab 0   • vitamin D (CHOLECALCIFEROL) 1000 UNIT Tab Take 2,000 Units by mouth every day.     • cyclobenzaprine (FLEXERIL) 5 MG tablet Take 1-2 Tabs by mouth 3 times a day as needed for Muscle Spasms. 90 Tab 3     No current facility-administered medications on file prior to visit.          Allergies:   Allergies   Allergen Reactions   • Ceftin [Cefuroxime Axetil]      Pruritis       Social Hx:   Social History     Social History   • Marital status:      Spouse name: N/A   • Number of children: N/A   • Years of  "education: N/A     Occupational History   • Not on file.     Social History Main Topics   • Smoking status: Former Smoker     Packs/day: 0.50     Years: 12.00     Types: Cigarettes     Quit date: 10/7/1982   • Smokeless tobacco: Never Used      Comment: Started in 1970   • Alcohol use 0.6 oz/week     1 Glasses of wine per week      Comment: 1-2 wine with dinner 2-3 times a week.   • Drug use: No      Comment: As youth   • Sexual activity: Yes     Partners: Male     Birth control/ protection: Post-Menopausal     Other Topics Concern   •  Service No   • Blood Transfusions No   • Caffeine Concern No   • Occupational Exposure No   • Hobby Hazards No   • Sleep Concern No   • Stress Concern No   • Weight Concern No   • Special Diet No   • Back Care No   • Exercise Yes   • Bike Helmet No   • Seat Belt Yes   • Self-Exams Yes     Social History Narrative   • No narrative on file         EXAMINATION     Physical Exam:   Vitals: /68 (BP Location: Left arm, Patient Position: Sitting, BP Cuff Size: Adult)   Pulse 80   Temp 36.9 °C (98.5 °F) (Temporal)   Ht 1.575 m (5' 2\")   Wt 55.4 kg (122 lb 2.2 oz)   SpO2 98%     Constitutional:   Body Habitus: Body mass index is 22.34 kg/m².  Cooperation: Fully cooperates with exam  Appearance: Well-groomed no disheveled    Respiratory-  breathing comfortable on room air, no audible wheezing  Cardiovascular- capillary refills less than 2 seconds. No lower extremity edema is noted.   Psychiatric- alert and oriented ×3. Normal affect.    MSK: -Phalen's is positive.  Tenderness palpation of the right cervical facet joints.  No signs of infection over the injection sites.  Alfonso is 5/5 in the bilateral upper extremities.          MEDICAL DECISION MAKING    DATA    Labs:   Lab Results   Component Value Date/Time    SODIUM 139 03/13/2018 10:02 AM    POTASSIUM 4.5 03/13/2018 10:02 AM    CHLORIDE 106 03/13/2018 10:02 AM    CO2 26 03/13/2018 10:02 AM    GLUCOSE 90 03/13/2018 10:02 " AM    BUN 18 03/13/2018 10:02 AM    CREATININE 0.72 03/13/2018 10:02 AM        No results found for: PROTHROMBTM, INR     No results found for: WBC, RBC, HEMOGLOBIN, HEMATOCRIT, MCV, MCH, MCHC, MPV, NEUTSPOLYS, LYMPHOCYTES, MONOCYTES, EOSINOPHILS, BASOPHILS, HYPOCHROMIA, ANISOCYTOSIS     No results found for: HBA1C       Imaging:   I personally reviewed following images      I reviewed the following radiology reports                                 Results for orders placed during the hospital encounter of 03/25/19   MR-CERVICAL SPINE-W/O    Impression 1.  Multilevel degenerative disc disease and facet degeneration. No central canal narrowing. There is mild left neural foraminal narrowing at C4-5 and C6-7.    2.  Mild kyphotic deformity.    3.  Mild multilevel degenerative subluxation.                                                                                                                                                                                                                                                                DIAGNOSIS   Visit Diagnoses     ICD-10-CM   1. Spondylosis of cervical region without myelopathy or radiculopathy M47.812   2. DDD (degenerative disc disease), cervical M50.30   3. Right carpal tunnel syndrome G56.01   4. Arthritis of right knee M17.11         ASSESSMENT and PLAN:     Jon CHEN Jose 68 y.o. female      Jon was seen today for follow-up.    Diagnoses and all orders for this visit:    Spondylosis of cervical region without myelopathy or radiculopathy  Comments:  Not controlled, causing periscapular and right-sided neck pain.  Medial branch block #1 was positive.  I ordered MBB #2 targeting C3-4, C4-5, C5-6 facet joints  Orders:  -     REFERRAL TO PHYSICIAL MEDICINE REHAB  -     REFERRAL TO EMG - PHYSIATRY (PMR)    DDD (degenerative disc disease), cervical  -     REFERRAL TO EMG - PHYSIATRY (PMR)    Right carpal tunnel syndrome  Comments:  Not controlled, not improving  with splinting.  EMG right upper extremity ordered.  Carpal tunnel injection ordered and to be considered after EMG  Orders:  -     REFERRAL TO EMG - PHYSIATRY (PMR)  -     REFERRAL TO PHYSIATRY (PMR)    Arthritis of right knee  Comments:  stable          Follow up: after the above procedure    Thank you for allowing me to participate in the care of this patient. If you have any questions please not hesitate to contact me.        Please note that this dictation was created using voice recognition software. I have made every reasonable attempt to correct obvious errors but there may be errors of grammar and content that I may have overlooked prior to finalization of this note.      Wallace Dejesus MD  Interventional Spine and Sports Physiatry  Physical Medicine and Rehabilitation  [facility]  5/30/2019  3:21 PM

## 2019-05-30 NOTE — PATIENT INSTRUCTIONS
Your procedure will be at the [facility] special procedure suite.    [facility] [place] [address]       PRE-PROCEDURE INSTRUCTIONS  You may take your regular medications except:   · No Anti-inflammatories 5 days prior to your procedure. Anti-inflammatories include medicines such as  ibuprofen (Motrin, Advil), Excedrin, Naproxen (Aleve, Anaprox, Naprelan, Naprosyn), Celecoxib (Celebrex), Diclofenac (Voltaren-XR tab), and Meloxicam (Mobic).   · No Glucophage or Metformin 24 hours before your procedure. You may resume next day after your procedure.  · Call the physiatry office if you are taking or prescribed anti-biotics within five days of procedure.  · Please ask provider if you are taking any new diabetes medication.  · CONTINUE TAKING BLOOD PRESSURE MEDICATIONS AS PRESCRIBED.  · Pain medications will not be prescribed on the procedure day. Procedural pain medication may be used by your provider   · Call your doctor's office performing the procedure if you have a fever, chills, rash or new illness prior to your procedure    Anticoagulation/antiplatelet medications  No Blood thinning medications such as Coumadin or Plavix 5 days prior to procedure unless your doctor said to continue these medications. Call your doctor if a new medication is prescribed in this class.     Restrictions for eating before procedure:   · If you are getting procedural sedation, then do not eat to for 8 hours prior to procedure appointment time. Do not drink fluids for four hours prior to your procedure time.   · If you are not having procedural sedation, then Skip the meal prior to your procedure. If you have a morning procedure then skip breakfast. If you have an afternoon procedure then skip lunch.   · You may drink clear liquids up to 2 hours prior to your procedure  · You must have a  the day of procedure to accompany you home.      POST PROCEDURE INSTRUCTIONS   · No heavy lifting, strenuous bending or  strenuous exercise for 3 days after your procedure.  · No hot tubs, baths, swimming for 3 days after your procedure  · You can remove the bandage the day after the procedure.  · If you received a diagnostic procedure (such as a medial branch block), please note that we do expect this injection to wear off. It is important to complete the pain diary and list the pain score only for the region where the procedure was and bring this to your follow up visit.   · Your leg may feel heavy, weak and numb for up to 1-2 days. Be very careful walking.   ·  You may resume normal activities 3 days after procedure.

## 2019-06-10 ENCOUNTER — HOSPITAL ENCOUNTER (OUTPATIENT)
Dept: PAIN MANAGEMENT | Facility: REHABILITATION | Age: 69
End: 2019-06-10
Attending: PHYSICAL MEDICINE & REHABILITATION
Payer: MEDICARE

## 2019-06-10 ENCOUNTER — HOSPITAL ENCOUNTER (OUTPATIENT)
Dept: RADIOLOGY | Facility: REHABILITATION | Age: 69
End: 2019-06-10
Attending: PHYSICAL MEDICINE & REHABILITATION

## 2019-06-10 VITALS
DIASTOLIC BLOOD PRESSURE: 72 MMHG | HEART RATE: 78 BPM | TEMPERATURE: 98.3 F | RESPIRATION RATE: 20 BRPM | SYSTOLIC BLOOD PRESSURE: 120 MMHG | OXYGEN SATURATION: 98 % | BODY MASS INDEX: 21.99 KG/M2 | WEIGHT: 119.49 LBS | HEIGHT: 62 IN

## 2019-06-10 PROCEDURE — 64490 INJ PARAVERT F JNT C/T 1 LEV: CPT

## 2019-06-10 PROCEDURE — 700101 HCHG RX REV CODE 250

## 2019-06-10 PROCEDURE — 700111 HCHG RX REV CODE 636 W/ 250 OVERRIDE (IP)

## 2019-06-10 PROCEDURE — 64491 INJ PARAVERT F JNT C/T 2 LEV: CPT

## 2019-06-10 PROCEDURE — 700117 HCHG RX CONTRAST REV CODE 255

## 2019-06-10 PROCEDURE — 99152 MOD SED SAME PHYS/QHP 5/>YRS: CPT

## 2019-06-10 PROCEDURE — 64492 INJ PARAVERT F JNT C/T 3 LEV: CPT

## 2019-06-10 RX ORDER — LIDOCAINE HYDROCHLORIDE 20 MG/ML
INJECTION, SOLUTION EPIDURAL; INFILTRATION; INTRACAUDAL; PERINEURAL
Status: COMPLETED
Start: 2019-06-10 | End: 2019-06-10

## 2019-06-10 RX ORDER — MIDAZOLAM HYDROCHLORIDE 1 MG/ML
INJECTION INTRAMUSCULAR; INTRAVENOUS
Status: COMPLETED
Start: 2019-06-10 | End: 2019-06-10

## 2019-06-10 RX ORDER — LIDOCAINE HYDROCHLORIDE 10 MG/ML
INJECTION, SOLUTION EPIDURAL; INFILTRATION; INTRACAUDAL; PERINEURAL
Status: COMPLETED
Start: 2019-06-10 | End: 2019-06-10

## 2019-06-10 RX ADMIN — IOHEXOL 4 ML: 240 INJECTION, SOLUTION INTRATHECAL; INTRAVASCULAR; INTRAVENOUS; ORAL at 14:23

## 2019-06-10 RX ADMIN — MIDAZOLAM HYDROCHLORIDE 1 MG: 1 INJECTION, SOLUTION INTRAMUSCULAR; INTRAVENOUS at 14:18

## 2019-06-10 RX ADMIN — LIDOCAINE HYDROCHLORIDE 10 ML: 10 INJECTION, SOLUTION EPIDURAL; INFILTRATION; INTRACAUDAL; PERINEURAL at 14:23

## 2019-06-10 RX ADMIN — LIDOCAINE HYDROCHLORIDE 5 ML: 20 INJECTION, SOLUTION EPIDURAL; INFILTRATION; INTRACAUDAL; PERINEURAL at 14:21

## 2019-06-10 NOTE — PROCEDURES
Date of Service: see epic time stamp for DOS    Patient: Jon Garcia 68 y.o. female     MRN: [id number]     Physician/s: Wallace Dejesus MD    Pre-operative Diagnosis: Cervical spondylosis, facet arthropathy    Post-operative Diagnosis: Cervical spondylosis, facet arthropathy    Procedure: RIGHT C3-4, C4-5 and C5-6 Medial Branch Block with sedation Versed only    Description of procedure:    The risks, benefits, and alternatives of the procedure were reviewed and discussed with the patient.  Written informed consent was freely obtained. A pre-procedural time-out was conducted by the physician verifying patient’s identity, procedure to be performed, procedure site and side, and allergy verification. Appropriate equipment was determined to be in place for the procedure.     Moderation sedation was achieved with Versed (1mg). Monitoring of the patients vital signs and respiratory status was provided by trained independent registered nurse during the entire course of the procedures and under my supervision and recoded in the patient’s medical record. The duration of sedation was over 10 minutes.     The patient's vital signs were carefully monitored before, throughout, and after the procedure.     In the fluoroscopy suite the patient was placed in a prone position, a pillow placed underneath their chest, and the head was turned to the opposite side of injection. The skin was prepped and draped in the usual sterile fashion.  The C3-4 facet joint with medial branch runs was not easily seen.  I then changed to a lateral approach with the right side up.  The patient was reprepped with usual sterile fashion.  The fluoroscope was placed over the cervical neck at the appropriate injection angles, and the targets for injection were marked at the  Levels C3, C4, C5, C6 on the right side. 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 A 27g 1.5'' needle was placed into each of the markings levels as above, and approx 0.5mL of 1%  Lidocaine was injected subcutaneously into the epidermal and dermal layers. The needles were removed. A 25g 3.5'' needle was then placed at the towards the center of the articular pillars where the medial branch runs at the C3, C4, C5, C6 levels on the right side.  This was confirmed with AP and lateral oblique views. The needle tips were then verified by AP, contralateral oblique, and lateral views. In the AP view, less than 0.5 mL of contrast dye was used to highlight the medial branch while the fluoroscope was running live. Following negative aspiration, approx 0.3mL 2% lidocaine was then injected at the above levels, and the needles were removed intact after restyleted. The patient's back was covered with a 4x4 gauze, the area was cleansed with sterile normal saline, and a dressing was applied.     There were no complications noted, and patient was hemodynamically stable before and after the procedure.     The patient had 100% pain relief post procedure.  She was able to turn her head from side to side with no pain.    Follow-up visit is scheduled for 5/29/2019     Wallace Dejesus MD  Physical Medicine and Rehabilitation  Interventional Spine and Sports Physiatry  [facility]          CPT  Intraarticular joint or medial branch block (MBB) - cervical or thoracic (1st level):  66362  Intraarticular joint or medial branch block (MBB) - cervical or thoracic (2nd level):  92967  Intraarticular joint or medial branch block (MBB) - cervical or thoracic (3rd level):  45227  moderate procedural sedation first 15 minutes: 83509

## 2019-06-10 NOTE — NON-PROVIDER
Pre assessment complete, time out completed, including 2 pt identifiers, procedure, allergies, stop bang, pt history. Pt positioned on left side by cst, xrt, rn, ankles resting on pillow, hands at sides.

## 2019-06-18 ENCOUNTER — OFFICE VISIT (OUTPATIENT)
Dept: PHYSICAL MEDICINE AND REHAB | Facility: MEDICAL CENTER | Age: 69
End: 2019-06-18
Payer: MEDICARE

## 2019-06-18 VITALS
HEART RATE: 101 BPM | BODY MASS INDEX: 21.99 KG/M2 | WEIGHT: 119.49 LBS | SYSTOLIC BLOOD PRESSURE: 110 MMHG | DIASTOLIC BLOOD PRESSURE: 64 MMHG | HEIGHT: 62 IN | OXYGEN SATURATION: 95 % | TEMPERATURE: 98.3 F

## 2019-06-18 DIAGNOSIS — M47.812 SPONDYLOSIS OF CERVICAL REGION WITHOUT MYELOPATHY OR RADICULOPATHY: ICD-10-CM

## 2019-06-18 DIAGNOSIS — M17.12 ARTHRITIS OF LEFT KNEE: ICD-10-CM

## 2019-06-18 DIAGNOSIS — M50.30 DDD (DEGENERATIVE DISC DISEASE), CERVICAL: ICD-10-CM

## 2019-06-18 DIAGNOSIS — G56.01 RIGHT CARPAL TUNNEL SYNDROME: ICD-10-CM

## 2019-06-18 PROCEDURE — 95886 MUSC TEST DONE W/N TEST COMP: CPT | Performed by: PHYSICAL MEDICINE & REHABILITATION

## 2019-06-18 PROCEDURE — 95912 NRV CNDJ TEST 11-12 STUDIES: CPT | Performed by: PHYSICAL MEDICINE & REHABILITATION

## 2019-06-18 PROCEDURE — 99214 OFFICE O/P EST MOD 30 MIN: CPT | Mod: 25 | Performed by: PHYSICAL MEDICINE & REHABILITATION

## 2019-06-18 RX ORDER — MELOXICAM 15 MG/1
15 TABLET ORAL DAILY
Qty: 14 TAB | Refills: 0 | Status: SHIPPED | OUTPATIENT
Start: 2019-06-18 | End: 2019-07-02

## 2019-06-18 RX ORDER — SODIUM PHOSPHATE,MONO-DIBASIC 19G-7G/118
500 ENEMA (ML) RECTAL
COMMUNITY
End: 2019-10-01

## 2019-06-18 ASSESSMENT — PAIN SCALES - GENERAL: PAINLEVEL: NO PAIN

## 2019-06-18 ASSESSMENT — PATIENT HEALTH QUESTIONNAIRE - PHQ9: CLINICAL INTERPRETATION OF PHQ2 SCORE: 0

## 2019-06-18 NOTE — PROCEDURES
"  [facility] hospital  Sports and Spine, Physiatry, EMG  [facility]  [address]  [place] [address]    Test Date:  2019    Patient: Jon Garcia : 1950 Physician: Wallace Dejseus M.D.   Sex: Female Height: 5' 2\" Ref Phys: Wallace Dejesus MD   ID#: [id number] Weight: 119.49 lbs. Technician: Wallace Dejesus     Patient Complaints:  Hand numbness and weakness first three digits. See clinic notes from Dr Dejesus for more details.       Patient History / Exam:  Temp 98F at the right carpal tunnel        NCV & EMG Findings:  Evaluation of the right median motor nerve showed prolonged distal onset latency (6.0 ms) and reduced amplitude (3.6 mV).  The right median sensory nerve showed no response (Wrist).  The right median/radial (dig I) comparison nerve showed no response (Median).  The right median/ulnar (dig IV) comparison nerve showed prolonged distal peak latency (Median Wr, 7.5 ms), and abnormal peak latency difference (Median Wr-Ulnar Wr, 3.8 ms).  The right median/ulnar (palm) comparison nerve showed abnormal peak latency difference (Median Palm-Ulnar Palm, 0.7 ms).  All remaining nerves (as indicated in the following tables) were within normal limits.      Needle evaluation of the right first dorsal interosseous muscle showed increased spontaneous activity and diminished recruitment.  All remaining muscles (as indicated in the following table) showed no evidence of electrical instability.      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    Today's exam points against cervical radiculopathy, ulnar neuropathy, brachial plexopathy    Recommendations:  The patient has been using splints, given the severity today's exam I placed a referral to hand surgery for an evaluation for carpal tunnel release.  If the patient is not a surgical candidate and I would consider " for ultrasound-guided carpal tunnel injection on the right.    ___________________________  Wallace Dejesus M.D.        Nerve Conduction Studies  Anti Sensory Summary Table     Stim Site NR Peak (ms) Norm Peak (ms) P-T Amp (µV) Norm P-T Amp Site1 Site2 Delta-P (ms) Dist (cm) Kevin (m/s) Norm Kevin (m/s)   Right Median Anti Sensory (2nd Digit)   Wrist NR  <3.6  >10 Wrist 2nd Digit  14.0  >39   Right Radial Anti Sensory (Base 1st Digit)   Wrist    2.2 <3.1 18.6  Wrist Base 1st Digit 2.2 0.0     Right Ulnar Anti Sensory (5th Digit)   Wrist    3.6 <3.7 39.9 >15.0 Wrist 5th Digit 3.6 14.0 39 >38     Motor Summary Table     Stim Site NR Onset (ms) Norm Onset (ms) O-P Amp (mV) Norm O-P Amp Site1 Site2 Delta-0 (ms) Dist (cm) Kevin (m/s) Norm Kevin (m/s)   Right Median Motor (Abd Poll Brev)   Wrist    6.0 <4.2 3.6 >5 Elbow Wrist 3.4 20.0 59 >50   Elbow    9.4  4.3          Right Ulnar Motor (Abd Dig Min)   Wrist    3.0 <4.2 9.1 >3 B Elbow Wrist 2.8 17.0 61 >53   B Elbow    5.8  8.7  A Elbow B Elbow 1.8 10.0 56 >53   A Elbow    7.6  8.6            Comparison Summary Table     Stim Site NR Peak (ms) Norm Peak (ms) P-T Amp (µV) Site1 Site2 Delta-P (ms) Norm Delta (ms)   Right Median/Radial Dig I Comparison (Digit 1 - 10cm)   Median NR  <2.9  Median Radial  <0.4   Radial    2.6 <2.8 14.0           12.5  7.2       Right Median/Ulnar Dig IV Comparison (Digit 4 - 14cm)   Median Wr    7.5 <3.3 10.1 Median Wr Ulnar Wr 3.8 <0.4   Ulnar Wr    3.7 <3.3 14.9       Right Median/Ulnar Palm Comparison (Wrist - 8cm)   Median Palm    2.5 <2.5 60.6 Median Palm Ulnar Palm 0.7 <0.3   Ulnar Palm    1.8 <2.5 18.0         EMG+     Side Muscle Nerve Root Ins Act Fibs Psw Amp Dur Poly Recrt Int Pat Other Comment   Right Deltoid Axillary C5-6 Nml Nml Nml Nml Nml 0 Nml Nml None    Right Biceps Musculocut C5-6 Nml Nml Nml Nml Nml 0 Nml Nml None    Right Triceps Radial C6-7-8 Nml Nml Nml Nml Nml 0 Nml Nml None    Right ExtCarRadBrev Radial C6-7 Nml Nml Nml Nml  Nml 0 Nml Nml None    Right Ext Digitorum Radial (Post Int) C7-8 Nml Nml Nml Nml Nml 0 Nml Nml None    Right PronatorTeres Median C6-7 Nml Nml Nml Nml Nml 0 Nml Nml None    Right Abd Poll Brev Median C8-T1 Nml Nml Nml Nml Nml 0 Nml Nml None    Right 1stDorInt Ulnar C8-T1 Nml 3+ 3+ Nml Nml 0 Reduced Nml None            Waveforms:

## 2019-06-18 NOTE — PROGRESS NOTES
Follow up patient note  Interventional spine and sports physiatry, Physical medicine rehabilitation      Chief complaint:   Chief Complaint   Patient presents with   • Follow-Up     EMG-Upper extremity         HISTORY    Please see new patient note dated  by Dr Dejesus,  for more details.     HPI  Patient identification: Jon Garcia 68 y.o. female  With Diagnoses of Spondylosis of cervical region without myelopathy or radiculopathy, DDD (degenerative disc disease), cervical, Right carpal tunnel syndrome, and Arthritis of left knee were pertinent to this visit.       The patient had 100% pain relief after the injection which lasted for 4 hours. Then the pain returned and is severe at night, worse with neck movements, nonradiating. Denies recent injuries.     Knee pain currently, low grade, aching, nonradiating, constant. With flares but no flares recently.     Still with hand numbness right first three digits carpal tunnel syndrome. Wearing her splints. Numbness in the tips of the fingers.          ROS Red Flags :   Fever, Chills, Sweats: Denies  Involuntary Weight Loss: Denies  Bowel/Bladder Incontinence: Denies  Saddle Anesthesia: Denies        PMHx:   History reviewed. No pertinent past medical history.    PSHx:   Past Surgical History:   Procedure Laterality Date   • HYSTERECTOMY, TOTAL ABDOMINAL  7/2002    Still has cervix. Due to fibroids   • HERNIA REPAIR     • TONSILLECTOMY      Child       Family history   Family History   Problem Relation Age of Onset   • Stroke Mother    • Hypertension Mother    • Other Father         glaucoma   • Arthritis Father         knee/hip replacement   • Hypertension Brother    • Cancer Brother 54        Pancreatic   • Hypertension Brother    • Lung Disease Brother         asthma   • Hypertension Brother    • Arthritis Brother         knee replacement         Medications:   Outpatient Prescriptions Marked as Taking for the 6/18/19 encounter (Office Visit) with Wallace Dejesus,  M.D.   Medication Sig Dispense Refill   • glucosamine Sulfate 500 MG Cap Take 500 mg by mouth 3 times a day, with meals.     • meloxicam (MOBIC) 15 MG tablet Take 1 Tab by mouth every day for 14 days. Take with food. Do not take with other NSAIDs. Start taking the day after the procedure 14 Tab 0   • fluticasone (FLONASE) 50 MCG/ACT nasal spray Spray 2 Sprays in nose every day. Each Nostril 16 g 11   • estradiol (ESTRACE) 0.5 MG tablet Take 1 Tab by mouth every day. 90 Tab 1   • valACYclovir (VALTREX) 500 MG Tab Take 1 Tab by mouth 2 times a day. 40 Tab 2   • BIOTIN PO Take  by mouth.     • cyclobenzaprine (FLEXERIL) 5 MG tablet Take 1-2 Tabs by mouth 3 times a day as needed for Muscle Spasms. 90 Tab 3   • Multiple Vitamins-Minerals (MULTIVITAMIN PO) Take  by mouth.     • CALCIUM PO Take  by mouth.          Current Outpatient Prescriptions on File Prior to Visit   Medication Sig Dispense Refill   • fluticasone (FLONASE) 50 MCG/ACT nasal spray Spray 2 Sprays in nose every day. Each Nostril 16 g 11   • estradiol (ESTRACE) 0.5 MG tablet Take 1 Tab by mouth every day. 90 Tab 1   • valACYclovir (VALTREX) 500 MG Tab Take 1 Tab by mouth 2 times a day. 40 Tab 2   • BIOTIN PO Take  by mouth.     • cyclobenzaprine (FLEXERIL) 5 MG tablet Take 1-2 Tabs by mouth 3 times a day as needed for Muscle Spasms. 90 Tab 3   • Multiple Vitamins-Minerals (MULTIVITAMIN PO) Take  by mouth.     • CALCIUM PO Take  by mouth.     • vitamin D (CHOLECALCIFEROL) 1000 UNIT Tab Take 2,000 Units by mouth every day.       No current facility-administered medications on file prior to visit.          Allergies:   Allergies   Allergen Reactions   • Ceftin [Cefuroxime Axetil]      Pruritis       Social Hx:   Social History     Social History   • Marital status:      Spouse name: N/A   • Number of children: N/A   • Years of education: N/A     Occupational History   • Not on file.     Social History Main Topics   • Smoking status: Former Smoker      "Packs/day: 0.50     Years: 12.00     Types: Cigarettes     Quit date: 10/7/1982   • Smokeless tobacco: Never Used      Comment: Started in 1970   • Alcohol use 0.6 oz/week     1 Glasses of wine per week      Comment: 1-2 wine with dinner 2-3 times a week.   • Drug use: No      Comment: As youth   • Sexual activity: Yes     Partners: Male     Birth control/ protection: Post-Menopausal     Other Topics Concern   •  Service No   • Blood Transfusions No   • Caffeine Concern No   • Occupational Exposure No   • Hobby Hazards No   • Sleep Concern No   • Stress Concern No   • Weight Concern No   • Special Diet No   • Back Care No   • Exercise Yes   • Bike Helmet No   • Seat Belt Yes   • Self-Exams Yes     Social History Narrative   • No narrative on file         EXAMINATION     Physical Exam:   Vitals: /64 (BP Location: Right arm, Patient Position: Sitting, BP Cuff Size: Adult)   Pulse (!) 101   Temp 36.8 °C (98.3 °F) (Temporal)   Ht 1.575 m (5' 2\")   Wt 54.2 kg (119 lb 7.8 oz)   SpO2 95%     Constitutional:   Body Habitus: Body mass index is 21.86 kg/m².  Cooperation: Fully cooperates with exam  Appearance: Well-groomed no disheveled    Respiratory-  breathing comfortable on room air, no audible wheezing  Cardiovascular- capillary refills less than 2 seconds. No lower extremity edema is noted.   Psychiatric- alert and oriented ×3. Normal affect.    MSK: - no signs of infection on the injection sites. TTP mid cervical facets.           MEDICAL DECISION MAKING    DATA    Labs:   Lab Results   Component Value Date/Time    SODIUM 139 03/13/2018 10:02 AM    POTASSIUM 4.5 03/13/2018 10:02 AM    CHLORIDE 106 03/13/2018 10:02 AM    CO2 26 03/13/2018 10:02 AM    GLUCOSE 90 03/13/2018 10:02 AM    BUN 18 03/13/2018 10:02 AM    CREATININE 0.72 03/13/2018 10:02 AM        No results found for: PROTHROMBTM, INR     No results found for: WBC, RBC, HEMOGLOBIN, HEMATOCRIT, MCV, MCH, MCHC, MPV, NEUTSPOLYS, LYMPHOCYTES, " MONOCYTES, EOSINOPHILS, BASOPHILS, HYPOCHROMIA, ANISOCYTOSIS     No results found for: HBA1C       Imaging:   I personally reviewed following images      I reviewed the following radiology reports                                 Results for orders placed during the hospital encounter of 03/25/19   MR-CERVICAL SPINE-W/O    Impression 1.  Multilevel degenerative disc disease and facet degeneration. No central canal narrowing. There is mild left neural foraminal narrowing at C4-5 and C6-7.    2.  Mild kyphotic deformity.    3.  Mild multilevel degenerative subluxation.                                                                                                                                                                                                                                                                DIAGNOSIS   Visit Diagnoses     ICD-10-CM   1. Spondylosis of cervical region without myelopathy or radiculopathy M47.812   2. DDD (degenerative disc disease), cervical M50.30   3. Right carpal tunnel syndrome G56.01   4. Arthritis of left knee M17.12         ASSESSMENT and PLAN:     Jon CHEN Jose 68 y.o. female      Jon was seen today for follow-up.    Diagnoses and all orders for this visit:    Spondylosis of cervical region without myelopathy or radiculopathy  -     REFERRAL TO PHYSICIAL MEDICINE REHAB  -     meloxicam (MOBIC) 15 MG tablet; Take 1 Tab by mouth every day for 14 days. Take with food. Do not take with other NSAIDs. Start taking the day after the procedure    DDD (degenerative disc disease), cervical    Right carpal tunnel syndrome  -     REFERRAL TO ORTHOPEDICS    Arthritis of left knee      The patient had significant pain relief temporarily following diagnostic medial branch blocks x2 targeting the right C3-4, C4-5, C5-6 facet joints.  After the blocks were off the pain returned as expected with diagnostic nerve blocks.  I believe the patient is a good candidate for a right radio  frequency neurotomy targeting the medial branches innervating the C3-4, C4-5, C5-6 facet joints with sedation.    The risks benefits and alternatives to this procedure were discussed and the patient wishes to proceed with the procedure. Risks include but are not limited to damage to surrounding structures, infection, bleeding, worsening of pain which can be permanent, weakness which can be permanent. Benefits include pain relief, improved function. Alternatives includes not doing the procedure.      The patient is very concerned for CPPD in the knee.  This is been a familial issue and she has severe arthritis in the knee with we have discussed that if she has significant swelling in the knee then she should call our office for an urgent appointment, I will double book her for this and schedule her for an aspiration and we can send this for analysis.      Follow up:  After the above procedure.       Thank you for allowing me to participate in the care of this patient. If you have any questions please not hesitate to contact me.        Please note that this dictation was created using voice recognition software. I have made every reasonable attempt to correct obvious errors but there may be errors of grammar and content that I may have overlooked prior to finalization of this note.      Wallace Dejesus MD  Interventional Spine and Sports Physiatry  Physical Medicine and Rehabilitation  [facility]  6/26/2019  9:46 AM

## 2019-06-18 NOTE — PATIENT INSTRUCTIONS
Your procedure will be at the [facility] special procedure suite.    [facility] [place] [address]       PRE-PROCEDURE INSTRUCTIONS  You may take your regular medications except:   · No Anti-inflammatories 5 days prior to your procedure. Anti-inflammatories include medicines such as  ibuprofen (Motrin, Advil), Excedrin, Naproxen (Aleve, Anaprox, Naprelan, Naprosyn), Celecoxib (Celebrex), Diclofenac (Voltaren-XR tab), and Meloxicam (Mobic).   · No Glucophage or Metformin 24 hours before your procedure. You may resume next day after your procedure.  · Call the physiatry office if you are taking or prescribed anti-biotics within five days of procedure.  · Please ask provider if you are taking any new diabetes medication.  · CONTINUE TAKING BLOOD PRESSURE MEDICATIONS AS PRESCRIBED.  · Pain medications will not be prescribed on the procedure day. Procedural pain medication may be used by your provider   · Call your doctor's office performing the procedure if you have a fever, chills, rash or new illness prior to your procedure    Anticoagulation/antiplatelet medications  No Blood thinning medications such as Coumadin or Plavix 5 days prior to procedure unless your doctor said to continue these medications. Call your doctor if a new medication is prescribed in this class.     Restrictions for eating before procedure:   · If you are getting procedural sedation, then do not eat to for 8 hours prior to procedure appointment time. Do not drink fluids for four hours prior to your procedure time.   · If you are not having procedural sedation, then Skip the meal prior to your procedure. If you have a morning procedure then skip breakfast. If you have an afternoon procedure then skip lunch.   · You may drink clear liquids up to 2 hours prior to your procedure  · You must have a  the day of procedure to accompany you home.      POST PROCEDURE INSTRUCTIONS   · No heavy lifting, strenuous bending or  strenuous exercise for 3 days after your procedure.  · No hot tubs, baths, swimming for 3 days after your procedure  · You can remove the bandage the day after the procedure.  · IF YOU RECEIVED A RADIOFREQUENCY PROCEDURE, THERE MAY BE SOME SORENESS AFTER THE PROCEDURE.  THIS IS NORMAL.  · IF YOU EXPERIENCE PROLONGED WEAKNESS LONGER THAN ONE DAY, BOWEL OR BLADDER INCONTINENCE THEN PLEASE CALL THE PHYSIATRY OFFICE.  · Your leg may feel heavy, weak and numb for up to 1-2 days. Be very careful walking.   ·  You may resume normal activities 3 days after procedure.

## 2019-06-26 ENCOUNTER — HOSPITAL ENCOUNTER (OUTPATIENT)
Dept: PAIN MANAGEMENT | Facility: REHABILITATION | Age: 69
End: 2019-06-26
Attending: PHYSICAL MEDICINE & REHABILITATION
Payer: MEDICARE

## 2019-06-26 ENCOUNTER — HOSPITAL ENCOUNTER (OUTPATIENT)
Dept: RADIOLOGY | Facility: REHABILITATION | Age: 69
End: 2019-06-26
Attending: PHYSICAL MEDICINE & REHABILITATION

## 2019-06-26 VITALS
BODY MASS INDEX: 22.15 KG/M2 | OXYGEN SATURATION: 100 % | SYSTOLIC BLOOD PRESSURE: 138 MMHG | RESPIRATION RATE: 17 BRPM | DIASTOLIC BLOOD PRESSURE: 72 MMHG | WEIGHT: 120.37 LBS | TEMPERATURE: 98.2 F | HEART RATE: 63 BPM | HEIGHT: 62 IN

## 2019-06-26 PROCEDURE — 64633 DESTROY CERV/THOR FACET JNT: CPT

## 2019-06-26 PROCEDURE — 99152 MOD SED SAME PHYS/QHP 5/>YRS: CPT

## 2019-06-26 PROCEDURE — 64634 DESTROY C/TH FACET JNT ADDL: CPT

## 2019-06-26 PROCEDURE — 99153 MOD SED SAME PHYS/QHP EA: CPT

## 2019-06-26 PROCEDURE — 700111 HCHG RX REV CODE 636 W/ 250 OVERRIDE (IP)

## 2019-06-26 RX ORDER — MIDAZOLAM HYDROCHLORIDE 1 MG/ML
INJECTION INTRAMUSCULAR; INTRAVENOUS
Status: COMPLETED
Start: 2019-06-26 | End: 2019-06-26

## 2019-06-26 RX ORDER — LIDOCAINE HYDROCHLORIDE 10 MG/ML
INJECTION, SOLUTION EPIDURAL; INFILTRATION; INTRACAUDAL; PERINEURAL
Status: COMPLETED
Start: 2019-06-26 | End: 2019-06-26

## 2019-06-26 RX ADMIN — MIDAZOLAM HYDROCHLORIDE 1 MG: 1 INJECTION, SOLUTION INTRAMUSCULAR; INTRAVENOUS at 14:35

## 2019-06-26 RX ADMIN — LIDOCAINE HYDROCHLORIDE 30 ML: 10 INJECTION, SOLUTION EPIDURAL; INFILTRATION; INTRACAUDAL; PERINEURAL at 14:49

## 2019-06-26 RX ADMIN — FENTANYL CITRATE 50 MCG: 50 INJECTION, SOLUTION INTRAMUSCULAR; INTRAVENOUS at 14:35

## 2019-06-26 NOTE — NON-PROVIDER
1321 PM.  Denied taking any blood thinners and  any anti- inflammatories medications. Printed home care education and verbal instruction given to patient and verbalized understanding.Patient had a  ( spouse ).Stop bang score # 2 .   Dr. Dejesus made aware and assessed patient. . Hand off reported to ORTEGA Yates RN.

## 2019-06-26 NOTE — NON-PROVIDER
1522 PM    . Ambulatory with RN from procedure room.  Tolerated fluids well.  Ice pack applied to affected area. Patient able to  move all extremities without difficulty voluntarily and on command.  Dr. Dejesus evaluated patient.

## 2019-06-26 NOTE — PROCEDURES
Date of Service: 6/26/2019    Physician/s: Wallace Dejesus MD    Pre-operative Diagnosis: Cervical spondylosis, facet arthropathy     Post-operative Diagnosis: Cervical spondylosis, facet arthropathy     Procedure: RIGHT Medial Branch Radiofrequency neurotomy targeting the C3-4, C4-5 and C5-6 facet joints    Description of procedure:  The risks, benefits, and alternatives of the procedure were reviewed and discussed with the patient.  Written informed consent was freely obtained. A pre-procedural time-out was conducted by the physician verifying patient’s identity, procedure to be performed, procedure site and side, and allergy verification. Appropriate equipment was determined to be in place for the procedure.     An IV was placed peripherally, the patient received 1mg of IV Versed for anxiolysis, and 50mcg  of IV Fentanyl for pain control. The patient's vital signs were carefully monitored before, throughout, and after the procedure. This was for greater than 12minutes.     In the fluoroscopy suite the patient was placed in a prone position, a pillow placed underneath their chest, and the head was turned to the opposite side of injection. The skin was prepped and draped in the usual sterile fashion. The fluoroscope was placed over the cervical neck at the appropriate angles, and the targets for needle/probe placement were marked. A 25g needle was placed into each of the markings at three levels, and 1cc of 1% Lidocaine was injected subcutaneously into the epidermal and dermal layers. The needle was removed.     An 50mm cooled radiofrequency cervical probe was placed with a lateral approach with a medial branch runs at the centroid of the articular pillars at the right C3 level.        This was targetting The needle/probe tips were then verified by AP and lateral. Once the needle/probe tips were in the optimal positions for radiofrequency neurotomy, Motor stimulation is used as an extra precaution to ensure the  needle position is off the cervical nerve roots, prior to each lesion.  There is no motor stimulation in the arm and there was twitching in the cervical paraspinous muscles as expected.  Following negative aspiration, 1cc of 1% Lidocaine was then injected at each location. After a wait period of approximately 1 minute, a radiofrequency lesion was then created at each level with a temperature of 80 degrees centigrade for 2 minutes and 30 seconds with cooled radiofrequency.    An 50mm cooled radiofrequency cervical probe was placed with a lateral approach with a medial branch runs at the centroid of the articular pillars at the right C4 level.        This was targetting The needle/probe tips were then verified by AP and lateral. Once the needle/probe tips were in the optimal positions for radiofrequency neurotomy, Motor stimulation is used as an extra precaution to ensure the needle position is off the cervical nerve roots, prior to each lesion.  There is no motor stimulation in the arm and there was twitching in the cervical paraspinous muscles as expected.  Following negative aspiration, 1cc of 1% Lidocaine was then injected at each location. After a wait period of approximately 1 minute, a radiofrequency lesion was then created at each level with a temperature of 80 degrees centigrade for 2 minutes and 30 seconds with cooled radiofrequency.    An 50mm cooled radiofrequency cervical probe was placed with a lateral approach with a medial branch runs at the centroid of the articular pillars at the right C6 level.        This was targetting The needle/probe tips were then verified by AP and lateral. Once the needle/probe tips were in the optimal positions for radiofrequency neurotomy, Motor stimulation is used as an extra precaution to ensure the needle position is off the cervical nerve roots, prior to each lesion.  There is no motor stimulation in the arm and there was twitching in the cervical paraspinous muscles as  expected.  Following negative aspiration, 1cc of 1% Lidocaine was then injected at each location. After a wait period of approximately 1 minute, a radiofrequency lesion was then created at each level with a temperature of 80 degrees centigrade for 2 minutes and 30 seconds with cooled radiofrequency.    An 50mm cooled radiofrequency cervical probe was placed with a lateral approach with a medial branch runs at the centroid of the articular pillars at the right C5 level.         This was targetting The needle/probe tips were then verified by AP and lateral. Once the needle/probe tips were in the optimal positions for radiofrequency neurotomy, Motor stimulation is used as an extra precaution to ensure the needle position is off the cervical nerve roots, prior to each lesion.  There is no motor stimulation in the arm and there was twitching in the cervical paraspinous muscles as expected.  Following negative aspiration, 1cc of 1% Lidocaine was then injected at each location. After a wait period of approximately 1 minute, a radiofrequency lesion was then created at each level with a temperature of 80 degrees centigrade for 2 minutes and 30 seconds with cooled radiofrequency.      The radiofrequency probes were removed intact.      The patient's back was covered with a 4x4 gauze, the area was cleansed with sterile normal saline, and a dressing was applied. There were no complications noted, the patient remained hemodynamically stable, and the patient tolerated the procedure well.    Wallace Dejesus MD  Physical Medicine and Rehabilitation  Interventional Spine and Sports Physiatry  [facility]        CPT  Intraarticular joint or medial branch block (MBB) - cervical or thoracic (1st level):  59588  Intraarticular joint or medial branch block (MBB) - cervical or thoracic (2nd level):  59763  Intraarticular joint or medial branch block (MBB) - cervical or thoracic (3rd level):  19625  moderate procedural sedation  first 15 minutes: 65170

## 2019-06-27 ENCOUNTER — TELEPHONE (OUTPATIENT)
Dept: PHYSICAL MEDICINE AND REHAB | Facility: MEDICAL CENTER | Age: 69
End: 2019-06-27

## 2019-06-27 NOTE — TELEPHONE ENCOUNTER
Spoke to Pt in regards to her Special procedure that was done 06/26/19 w/ Dr. Dejesus and she mentioned that she is doing fine.      Thank you  Aleisha

## 2019-07-11 ENCOUNTER — HOSPITAL ENCOUNTER (OUTPATIENT)
Dept: RADIOLOGY | Facility: MEDICAL CENTER | Age: 69
End: 2019-07-11
Attending: FAMILY MEDICINE
Payer: MEDICARE

## 2019-07-11 DIAGNOSIS — Z12.39 SCREENING FOR BREAST CANCER: ICD-10-CM

## 2019-07-11 PROCEDURE — 77063 BREAST TOMOSYNTHESIS BI: CPT

## 2019-07-18 ENCOUNTER — OFFICE VISIT (OUTPATIENT)
Dept: PHYSICAL MEDICINE AND REHAB | Facility: MEDICAL CENTER | Age: 69
End: 2019-07-18
Payer: MEDICARE

## 2019-07-18 VITALS
TEMPERATURE: 97.9 F | HEIGHT: 62 IN | HEART RATE: 98 BPM | DIASTOLIC BLOOD PRESSURE: 62 MMHG | WEIGHT: 119.05 LBS | BODY MASS INDEX: 21.91 KG/M2 | OXYGEN SATURATION: 98 % | SYSTOLIC BLOOD PRESSURE: 104 MMHG

## 2019-07-18 DIAGNOSIS — M71.22 BAKER CYST, LEFT: ICD-10-CM

## 2019-07-18 DIAGNOSIS — G56.01 RIGHT CARPAL TUNNEL SYNDROME: ICD-10-CM

## 2019-07-18 DIAGNOSIS — M79.10 MYALGIA: ICD-10-CM

## 2019-07-18 DIAGNOSIS — M47.812 SPONDYLOSIS OF CERVICAL REGION WITHOUT MYELOPATHY OR RADICULOPATHY: ICD-10-CM

## 2019-07-18 DIAGNOSIS — M50.30 DDD (DEGENERATIVE DISC DISEASE), CERVICAL: ICD-10-CM

## 2019-07-18 DIAGNOSIS — M17.12 ARTHRITIS OF LEFT KNEE: ICD-10-CM

## 2019-07-18 PROCEDURE — 99214 OFFICE O/P EST MOD 30 MIN: CPT | Performed by: PHYSICAL MEDICINE & REHABILITATION

## 2019-07-18 ASSESSMENT — PAIN SCALES - GENERAL: PAINLEVEL: 4=SLIGHT-MODERATE PAIN

## 2019-07-18 ASSESSMENT — PATIENT HEALTH QUESTIONNAIRE - PHQ9: CLINICAL INTERPRETATION OF PHQ2 SCORE: 0

## 2019-07-18 NOTE — Clinical Note
Dear Bertha Gómez M.D. ,   Thank you for the referral of Jon Garcia.  Significantly improved after radiofrequency neurotomy of the cervical medial branches.  Please see my note for more details    Should you have any questions or concerns please do not hesitate to contact me.  Wallace Dejesus M.D.

## 2019-07-18 NOTE — PROGRESS NOTES
Follow up patient note  Interventional spine and sports physiatry, Physical medicine rehabilitation      Chief complaint:   Chief Complaint   Patient presents with   • Follow-Up     Neck pain         HISTORY    Please see new patient note dated  by Dr Dejesus,  for more details.     HPI  Patient identification: Jon Garcia 68 y.o. female  With Diagnoses of Spondylosis of cervical region without myelopathy or radiculopathy, DDD (degenerative disc disease), cervical, Right carpal tunnel syndrome, Arthritis of left knee, Baker cyst, left, and Myalgia were pertinent to this visit.     After the radio frequency neurotomy the patient had significant pain relief in her neck.  She reports that this is 70% improved overall.  The typical axial neck pain that she had is now 2-3/10 in intensity.  And mostly tolerable.  In the upper cervical spine she does have a trigger point in the paraspinous muscle as well as in the trapezius muscle and rhomboid.  These are moderate intensity and causing difficulty with her exercise program.    Regards the patient's carpal tunnel she has significant symptoms with numbness and tingling and burning in the right hand and had surgery upcoming.    The patient's left knee pain has improved after injections that have previously been done.  Stable now          ROS Red Flags :   Fever, Chills, Sweats: Denies  Involuntary Weight Loss: Denies  Bowel/Bladder Incontinence: Denies  Saddle Anesthesia: Denies        PMHx:   History reviewed. No pertinent past medical history.    PSHx:   Past Surgical History:   Procedure Laterality Date   • HYSTERECTOMY, TOTAL ABDOMINAL  7/2002    Still has cervix. Due to fibroids   • HERNIA REPAIR     • TONSILLECTOMY      Child       Family history   Family History   Problem Relation Age of Onset   • Stroke Mother    • Hypertension Mother    • Other Father         glaucoma   • Arthritis Father         knee/hip replacement   • Hypertension Brother    • Cancer Brother 54         Pancreatic   • Hypertension Brother    • Lung Disease Brother         asthma   • Hypertension Brother    • Arthritis Brother         knee replacement         Medications:   Outpatient Prescriptions Marked as Taking for the 7/18/19 encounter (Office Visit) with Wallace Dejesus M.D.   Medication Sig Dispense Refill   • glucosamine Sulfate 500 MG Cap Take 500 mg by mouth 3 times a day, with meals.     • estradiol (ESTRACE) 0.5 MG tablet Take 1 Tab by mouth every day. 90 Tab 1   • BIOTIN PO Take  by mouth.     • Multiple Vitamins-Minerals (MULTIVITAMIN PO) Take  by mouth.     • CALCIUM PO Take  by mouth.     • vitamin D (CHOLECALCIFEROL) 1000 UNIT Tab Take 2,000 Units by mouth every day.          Current Outpatient Prescriptions on File Prior to Visit   Medication Sig Dispense Refill   • glucosamine Sulfate 500 MG Cap Take 500 mg by mouth 3 times a day, with meals.     • estradiol (ESTRACE) 0.5 MG tablet Take 1 Tab by mouth every day. 90 Tab 1   • BIOTIN PO Take  by mouth.     • Multiple Vitamins-Minerals (MULTIVITAMIN PO) Take  by mouth.     • CALCIUM PO Take  by mouth.     • vitamin D (CHOLECALCIFEROL) 1000 UNIT Tab Take 2,000 Units by mouth every day.     • fluticasone (FLONASE) 50 MCG/ACT nasal spray Spray 2 Sprays in nose every day. Each Nostril (Patient not taking: Reported on 7/18/2019) 16 g 11   • valACYclovir (VALTREX) 500 MG Tab Take 1 Tab by mouth 2 times a day. 40 Tab 2   • cyclobenzaprine (FLEXERIL) 5 MG tablet Take 1-2 Tabs by mouth 3 times a day as needed for Muscle Spasms. 90 Tab 3     No current facility-administered medications on file prior to visit.          Allergies:   Allergies   Allergen Reactions   • Ceftin [Cefuroxime Axetil]      Pruritis       Social Hx:   Social History     Social History   • Marital status:      Spouse name: N/A   • Number of children: N/A   • Years of education: N/A     Occupational History   • Not on file.     Social History Main Topics   • Smoking status:  "Former Smoker     Packs/day: 0.50     Years: 12.00     Types: Cigarettes     Quit date: 10/7/1982   • Smokeless tobacco: Never Used      Comment: Started in 1970   • Alcohol use 0.6 oz/week     1 Glasses of wine per week      Comment: 1-2 wine with dinner 2-3 times a week.   • Drug use: No      Comment: As youth   • Sexual activity: Yes     Partners: Male     Birth control/ protection: Post-Menopausal     Other Topics Concern   •  Service No   • Blood Transfusions No   • Caffeine Concern No   • Occupational Exposure No   • Hobby Hazards No   • Sleep Concern No   • Stress Concern No   • Weight Concern No   • Special Diet No   • Back Care No   • Exercise Yes   • Bike Helmet No   • Seat Belt Yes   • Self-Exams Yes     Social History Narrative   • No narrative on file         EXAMINATION     Physical Exam:   Vitals: /62 (BP Location: Right arm, Patient Position: Sitting, BP Cuff Size: Adult)   Pulse 98   Temp 36.6 °C (97.9 °F) (Temporal)   Ht 1.575 m (5' 2\")   Wt 54 kg (119 lb 0.8 oz)   SpO2 98%     Constitutional:   Body Habitus: Body mass index is 21.77 kg/m².  Cooperation: Fully cooperates with exam  Appearance: Well-groomed no disheveled    Respiratory-  breathing comfortable on room air, no audible wheezing  Cardiovascular- capillary refills less than 2 seconds. No lower extremity edema is noted.   Psychiatric- alert and oriented ×3. Normal affect.    MSK: -No signs of infection over the injection sites.  Trigger points in the upper cervical paraspinous muscles, right trapezius muscle and rhomboid.          MEDICAL DECISION MAKING    DATA    Labs:   Lab Results   Component Value Date/Time    SODIUM 139 03/13/2018 10:02 AM    POTASSIUM 4.5 03/13/2018 10:02 AM    CHLORIDE 106 03/13/2018 10:02 AM    CO2 26 03/13/2018 10:02 AM    GLUCOSE 90 03/13/2018 10:02 AM    BUN 18 03/13/2018 10:02 AM    CREATININE 0.72 03/13/2018 10:02 AM        No results found for: PROTHROMBTM, INR     No results found for: " WBC, RBC, HEMOGLOBIN, HEMATOCRIT, MCV, MCH, MCHC, MPV, NEUTSPOLYS, LYMPHOCYTES, MONOCYTES, EOSINOPHILS, BASOPHILS, HYPOCHROMIA, ANISOCYTOSIS     No results found for: HBA1C       Imaging:   I personally reviewed following images      I reviewed the following radiology reports                                 Results for orders placed during the hospital encounter of 03/25/19   MR-CERVICAL SPINE-W/O    Impression 1.  Multilevel degenerative disc disease and facet degeneration. No central canal narrowing. There is mild left neural foraminal narrowing at C4-5 and C6-7.    2.  Mild kyphotic deformity.    3.  Mild multilevel degenerative subluxation.                                                                                                                                                                                                                                                                DIAGNOSIS   Visit Diagnoses     ICD-10-CM   1. Spondylosis of cervical region without myelopathy or radiculopathy M47.812   2. DDD (degenerative disc disease), cervical M50.30   3. Right carpal tunnel syndrome G56.01   4. Arthritis of left knee M17.12   5. Crocker cyst, left M71.22   6. Myalgia M79.10         ASSESSMENT and PLAN:     Jon APRIL Garcia 68 y.o. female      Jon was seen today for follow-up.    Diagnoses and all orders for this visit:    Spondylosis of cervical region without myelopathy or radiculopathy  Comments:  Significant improvement with 70% pain reduction post radiofrequency neurotomy targeting the right C3-4, C4-5, C5-6 facet joints  Orders:  -     REFERRAL TO PHYSICAL THERAPY Reason for Therapy: Eval/Treat/Report    DDD (degenerative disc disease), cervical  -     REFERRAL TO PHYSICAL THERAPY Reason for Therapy: Eval/Treat/Report    Right carpal tunnel syndrome  Comments:  Not controlled, patient has upcoming surgery with hand surgery    Arthritis of left knee  Comments:  Stable    Baker cyst,  left    Myalgia  Comments:  New trigger points of myalgia described above.  Schedule trigger point injections in the area of pain with ultrasound guidance and physical therapy  Orders:  -     REFERRAL TO PHYSIATRY (PMR)  -     REFERRAL TO PHYSICAL THERAPY Reason for Therapy: Eval/Treat/Report            Follow up:  7/24/2019       Thank you for allowing me to participate in the care of this patient. If you have any questions please not hesitate to contact me.        Please note that this dictation was created using voice recognition software. I have made every reasonable attempt to correct obvious errors but there may be errors of grammar and content that I may have overlooked prior to finalization of this note.      Wallace Dejesus MD  Interventional Spine and Sports Physiatry  Physical Medicine and Rehabilitation  [facility]  7/18/2019  11:24 AM

## 2019-07-24 ENCOUNTER — OFFICE VISIT (OUTPATIENT)
Dept: PHYSICAL MEDICINE AND REHAB | Facility: MEDICAL CENTER | Age: 69
End: 2019-07-24
Payer: MEDICARE

## 2019-07-24 VITALS
HEART RATE: 92 BPM | HEIGHT: 62 IN | DIASTOLIC BLOOD PRESSURE: 62 MMHG | BODY MASS INDEX: 22.31 KG/M2 | SYSTOLIC BLOOD PRESSURE: 128 MMHG | WEIGHT: 121.25 LBS | OXYGEN SATURATION: 94 % | TEMPERATURE: 98.1 F

## 2019-07-24 DIAGNOSIS — M62.838 MUSCLE SPASM: ICD-10-CM

## 2019-07-24 DIAGNOSIS — M79.10 MYALGIA: ICD-10-CM

## 2019-07-24 DIAGNOSIS — G89.29 CHRONIC NECK PAIN: ICD-10-CM

## 2019-07-24 DIAGNOSIS — M54.2 CHRONIC NECK PAIN: ICD-10-CM

## 2019-07-24 DIAGNOSIS — M47.812 SPONDYLOSIS OF CERVICAL REGION WITHOUT MYELOPATHY OR RADICULOPATHY: ICD-10-CM

## 2019-07-24 PROCEDURE — 99214 OFFICE O/P EST MOD 30 MIN: CPT | Mod: 25 | Performed by: PHYSICAL MEDICINE & REHABILITATION

## 2019-07-24 PROCEDURE — 20553 NJX 1/MLT TRIGGER POINTS 3/>: CPT | Performed by: PHYSICAL MEDICINE & REHABILITATION

## 2019-07-24 PROCEDURE — 76942 ECHO GUIDE FOR BIOPSY: CPT | Performed by: PHYSICAL MEDICINE & REHABILITATION

## 2019-07-24 RX ORDER — DEXAMETHASONE SODIUM PHOSPHATE 4 MG/ML
4 INJECTION, SOLUTION INTRA-ARTICULAR; INTRALESIONAL; INTRAMUSCULAR; INTRAVENOUS; SOFT TISSUE ONCE
Status: COMPLETED | OUTPATIENT
Start: 2019-07-24 | End: 2019-07-24

## 2019-07-24 RX ORDER — CYCLOBENZAPRINE HCL 5 MG
5-10 TABLET ORAL 3 TIMES DAILY PRN
Qty: 90 TAB | Refills: 0 | Status: SHIPPED | OUTPATIENT
Start: 2019-07-24 | End: 2019-10-14 | Stop reason: SDUPTHER

## 2019-07-24 RX ADMIN — DEXAMETHASONE SODIUM PHOSPHATE 4 MG: 4 INJECTION, SOLUTION INTRA-ARTICULAR; INTRALESIONAL; INTRAMUSCULAR; INTRAVENOUS; SOFT TISSUE at 10:01

## 2019-07-24 ASSESSMENT — PAIN SCALES - GENERAL: PAINLEVEL: 4=SLIGHT-MODERATE PAIN

## 2019-07-24 ASSESSMENT — PATIENT HEALTH QUESTIONNAIRE - PHQ9: CLINICAL INTERPRETATION OF PHQ2 SCORE: 0

## 2019-07-24 NOTE — PROGRESS NOTES
Follow up patient note  Interventional spine and sports physiatry, Physical medicine rehabilitation      Chief complaint:   Chief Complaint   Patient presents with   • Follow-Up     Neck pain         HISTORY    Please see new patient note dated  by Dr Dejesus,  for more details.     HPI  Patient identification: Jon Garcia 68 y.o. female  With Diagnoses of Myalgia, Spondylosis of cervical region without myelopathy or radiculopathy, Muscle spasm, and Chronic neck pain were pertinent to this visit.     Patient continues to get significant pain relief from the radiofrequency neurotomy in her neck with greater than 70% pain relief but she still has spasm and tightness on the right side of the neck and right trapezius muscle with muscle spasm.  Previously this responded well to Flexeril.  Now the pain is 3/10 out of intensity, right side of the neck, nonradiating, associated with spasm in the trapezius muscle mostly but in the rhomboid and cervical paraspinous muscles.  Causing difficulty with head turning and exercise.      ROS Red Flags :   Fever, Chills, Sweats: Denies  Involuntary Weight Loss: Denies  Bowel/Bladder Incontinence: Denies  Saddle Anesthesia: Denies        PMHx:   History reviewed. No pertinent past medical history.    PSHx:   Past Surgical History:   Procedure Laterality Date   • HYSTERECTOMY, TOTAL ABDOMINAL  7/2002    Still has cervix. Due to fibroids   • HERNIA REPAIR     • TONSILLECTOMY      Child       Family history   Family History   Problem Relation Age of Onset   • Stroke Mother    • Hypertension Mother    • Other Father         glaucoma   • Arthritis Father         knee/hip replacement   • Hypertension Brother    • Cancer Brother 54        Pancreatic   • Hypertension Brother    • Lung Disease Brother         asthma   • Hypertension Brother    • Arthritis Brother         knee replacement         Medications:   Outpatient Prescriptions Marked as Taking for the 7/24/19 encounter (Office Visit)  with Wallace Dejesus M.D.   Medication Sig Dispense Refill   • cyclobenzaprine (FLEXERIL) 5 MG tablet Take 1-2 Tabs by mouth 3 times a day as needed for Severe Pain or Muscle Spasms. 90 Tab 0   • glucosamine Sulfate 500 MG Cap Take 500 mg by mouth 3 times a day, with meals.     • estradiol (ESTRACE) 0.5 MG tablet Take 1 Tab by mouth every day. 90 Tab 1   • BIOTIN PO Take  by mouth.     • Multiple Vitamins-Minerals (MULTIVITAMIN PO) Take  by mouth.     • CALCIUM PO Take  by mouth.     • vitamin D (CHOLECALCIFEROL) 1000 UNIT Tab Take 2,000 Units by mouth every day.          Current Outpatient Prescriptions on File Prior to Visit   Medication Sig Dispense Refill   • glucosamine Sulfate 500 MG Cap Take 500 mg by mouth 3 times a day, with meals.     • estradiol (ESTRACE) 0.5 MG tablet Take 1 Tab by mouth every day. 90 Tab 1   • BIOTIN PO Take  by mouth.     • Multiple Vitamins-Minerals (MULTIVITAMIN PO) Take  by mouth.     • CALCIUM PO Take  by mouth.     • vitamin D (CHOLECALCIFEROL) 1000 UNIT Tab Take 2,000 Units by mouth every day.     • fluticasone (FLONASE) 50 MCG/ACT nasal spray Spray 2 Sprays in nose every day. Each Nostril (Patient not taking: Reported on 7/18/2019) 16 g 11   • valACYclovir (VALTREX) 500 MG Tab Take 1 Tab by mouth 2 times a day. 40 Tab 2     No current facility-administered medications on file prior to visit.          Allergies:   Allergies   Allergen Reactions   • Ceftin [Cefuroxime Axetil]      Pruritis       Social Hx:   Social History     Social History   • Marital status:      Spouse name: N/A   • Number of children: N/A   • Years of education: N/A     Occupational History   • Not on file.     Social History Main Topics   • Smoking status: Former Smoker     Packs/day: 0.50     Years: 12.00     Types: Cigarettes     Quit date: 10/7/1982   • Smokeless tobacco: Never Used      Comment: Started in 1970   • Alcohol use 0.6 oz/week     1 Glasses of wine per week      Comment: 1-2 wine with  "dinner 2-3 times a week.   • Drug use: No      Comment: As youth   • Sexual activity: Yes     Partners: Male     Birth control/ protection: Post-Menopausal     Other Topics Concern   •  Service No   • Blood Transfusions No   • Caffeine Concern No   • Occupational Exposure No   • Hobby Hazards No   • Sleep Concern No   • Stress Concern No   • Weight Concern No   • Special Diet No   • Back Care No   • Exercise Yes   • Bike Helmet No   • Seat Belt Yes   • Self-Exams Yes     Social History Narrative   • No narrative on file         EXAMINATION     Physical Exam:   Vitals: /62 (BP Location: Right arm, Patient Position: Sitting, BP Cuff Size: Adult)   Pulse 92   Temp 36.7 °C (98.1 °F) (Temporal)   Ht 1.575 m (5' 2\")   Wt 55 kg (121 lb 4.1 oz)   SpO2 94%     Constitutional:   Body Habitus: Body mass index is 22.18 kg/m².  Cooperation: Fully cooperates with exam  Appearance: Well-groomed no disheveled    Respiratory-  breathing comfortable on room air, no audible wheezing  Cardiovascular- capillary refills less than 2 seconds. No lower extremity edema is noted.   Psychiatric- alert and oriented ×3. Normal affect.    MSK: -Strength is 5/5 in the bilateral upper extremities.  Sensation is intact in the bilateral upper extremities.        MEDICAL DECISION MAKING    DATA    Labs:   Lab Results   Component Value Date/Time    SODIUM 139 03/13/2018 10:02 AM    POTASSIUM 4.5 03/13/2018 10:02 AM    CHLORIDE 106 03/13/2018 10:02 AM    CO2 26 03/13/2018 10:02 AM    GLUCOSE 90 03/13/2018 10:02 AM    BUN 18 03/13/2018 10:02 AM    CREATININE 0.72 03/13/2018 10:02 AM        No results found for: PROTHROMBTM, INR     No results found for: WBC, RBC, HEMOGLOBIN, HEMATOCRIT, MCV, MCH, MCHC, MPV, NEUTSPOLYS, LYMPHOCYTES, MONOCYTES, EOSINOPHILS, BASOPHILS, HYPOCHROMIA, ANISOCYTOSIS     No results found for: HBA1C       Imaging:   I personally reviewed following images      I reviewed the following radiology reports            "                      Results for orders placed during the hospital encounter of 03/25/19   MR-CERVICAL SPINE-W/O    Impression 1.  Multilevel degenerative disc disease and facet degeneration. No central canal narrowing. There is mild left neural foraminal narrowing at C4-5 and C6-7.    2.  Mild kyphotic deformity.    3.  Mild multilevel degenerative subluxation.                                                                                                                                                                                                                                                                DIAGNOSIS   Visit Diagnoses     ICD-10-CM   1. Myalgia M79.10   2. Spondylosis of cervical region without myelopathy or radiculopathy M47.812   3. Muscle spasm M62.838   4. Chronic neck pain M54.2    G89.29         ASSESSMENT and PLAN:     Jon Garcia 68 y.o. female      Jon was seen today for follow-up.    Diagnoses and all orders for this visit:    Myalgia  Comments:  See separate procedure note today.  Orders:  -     dexamethasone (DECADRON) injection 4 mg; 1 mL by Injection route Once.    Spondylosis of cervical region without myelopathy or radiculopathy  Comments:  stable, significantly improved after cervical radiofrequency neurotomy  Orders:  -     cyclobenzaprine (FLEXERIL) 5 MG tablet; Take 1-2 Tabs by mouth 3 times a day as needed for Severe Pain or Muscle Spasms.    Muscle spasm  Comments:  Refilled Flexeril for intermittent use. Agree with continuing massage therapy.  We discussed getting a theracane for home use  Orders:  -     cyclobenzaprine (FLEXERIL) 5 MG tablet; Take 1-2 Tabs by mouth 3 times a day as needed for Severe Pain or Muscle Spasms.    Chronic neck pain  Comments:  Refilled Flexeril for intermittent use. Agree with continuing massage therapy.  We discussed getting a theracane for home use  Orders:  -     cyclobenzaprine (FLEXERIL) 5 MG tablet; Take 1-2 Tabs by mouth 3 times a  day as needed for Severe Pain or Muscle Spasms.            Follow up:one month      Thank you for allowing me to participate in the care of this patient. If you have any questions please not hesitate to contact me.        Please note that this dictation was created using voice recognition software. I have made every reasonable attempt to correct obvious errors but there may be errors of grammar and content that I may have overlooked prior to finalization of this note.      Wallace Dejesus MD  Interventional Spine and Sports Physiatry  Physical Medicine and Rehabilitation  [facility]  7/24/2019  10:17 AM

## 2019-07-24 NOTE — PROCEDURES
Date of Service: 7/24/2019     Physician/s: Wallace Dejesus MD    Pre-operative Diagnosis: Myalgia (M79.1)    Post-operative Diagnosis: Myalgia (M79.1)    Procedure: Right trapezius muscle, right rhomboid, right mid cervical paraspinous muscle, right lower cervical paraspinous muscle trigger point injections    Description of procedure:    The risks, benefits, and alternatives of the procedure were reviewed and discussed with the patient.  Written informed consent was freely obtained. A pre-procedural time-out was conducted by the physician verifying patient’s identity, procedure to be performed, procedure site and side, and allergy verification. Appropriate equipment was determined to be in place for the procedure.     In the office suite exam room the patient was placed in a prone position and the skin areas for injection over the Right trapezius muscle, right rhomboid, right mid cervical paraspinous muscle, right lower cervical paraspinous muscle  was marked. A solution was prepared with 1 mL of 4 mg/mL of dexamethasone, 3 mL of 1% lidocaine and 2 mL of sterile saline. The areas of pain was then prepped and draped in the usual sterile fashion. Ultrasound was confirmed to view the adjacent structures for blood vessels and nerves and to confirm the needle path was not within the structures nor was it too deep to be within the lung. A 27g needle was placed into each of the markings at the areas above under ultrasound guidance with an in plane approach.. After negative aspiration, approximately a 1.5 mL of the above solution was injected. The needle was removed intact after each trigger point injection, and the patient's back was covered with a 4x4 gauze, the area was cleansed with sterile normal saline, and a dressing was applied. There were no complications noted. The images were uploaded to our media tab for permanent storage.    Post procedure the muscle spasm was significantly improved when compared to  preprocedure.  No tenderness palpation around the trigger points post procedure.      Wallace Dejesus MD  Physical Medicine and Rehabilitation  Interventional Spine and Sports Physiatry  [facility]

## 2019-08-29 ENCOUNTER — OFFICE VISIT (OUTPATIENT)
Dept: PHYSICAL MEDICINE AND REHAB | Facility: MEDICAL CENTER | Age: 69
End: 2019-08-29
Payer: MEDICARE

## 2019-08-29 VITALS
WEIGHT: 118.61 LBS | BODY MASS INDEX: 21.83 KG/M2 | HEART RATE: 86 BPM | SYSTOLIC BLOOD PRESSURE: 120 MMHG | OXYGEN SATURATION: 97 % | TEMPERATURE: 98 F | HEIGHT: 62 IN | DIASTOLIC BLOOD PRESSURE: 62 MMHG

## 2019-08-29 DIAGNOSIS — M17.10 ARTHRITIS OF KNEE: ICD-10-CM

## 2019-08-29 DIAGNOSIS — M47.812 SPONDYLOSIS OF CERVICAL REGION WITHOUT MYELOPATHY OR RADICULOPATHY: ICD-10-CM

## 2019-08-29 DIAGNOSIS — L98.9 SKIN ABNORMALITY: ICD-10-CM

## 2019-08-29 DIAGNOSIS — G56.01 RIGHT CARPAL TUNNEL SYNDROME: ICD-10-CM

## 2019-08-29 DIAGNOSIS — M17.11 ARTHRITIS OF RIGHT KNEE: ICD-10-CM

## 2019-08-29 PROCEDURE — 99214 OFFICE O/P EST MOD 30 MIN: CPT | Performed by: PHYSICAL MEDICINE & REHABILITATION

## 2019-08-29 ASSESSMENT — PATIENT HEALTH QUESTIONNAIRE - PHQ9
9. THOUGHTS THAT YOU WOULD BE BETTER OFF DEAD, OR OF HURTING YOURSELF: 0
1. LITTLE INTEREST OR PLEASURE IN DOING THINGS: 0
SUM OF ALL RESPONSES TO PHQ QUESTIONS 1-9: 0
4. FEELING TIRED OR HAVING LITTLE ENERGY: 0
SUM OF ALL RESPONSES TO PHQ9 QUESTIONS 1 AND 2: 0
3. TROUBLE FALLING OR STAYING ASLEEP OR SLEEPING TOO MUCH: 0
5. POOR APPETITE OR OVEREATING: 0
6. FEELING BAD ABOUT YOURSELF - OR THAT YOU ARE A FAILURE OR HAVE LET YOURSELF OR YOUR FAMILY DOWN: 0
7. TROUBLE CONCENTRATING ON THINGS, SUCH AS READING THE NEWSPAPER OR WATCHING TELEVISION: 0
8. MOVING OR SPEAKING SO SLOWLY THAT OTHER PEOPLE COULD HAVE NOTICED. OR THE OPPOSITE, BEING SO FIGETY OR RESTLESS THAT YOU HAVE BEEN MOVING AROUND A LOT MORE THAN USUAL: 0
2. FEELING DOWN, DEPRESSED, IRRITABLE, OR HOPELESS: 0

## 2019-08-29 ASSESSMENT — PAIN SCALES - GENERAL: PAINLEVEL: 2=MINIMAL-SLIGHT

## 2019-08-29 NOTE — PROGRESS NOTES
Follow up patient note  Interventional spine and sports physiatry, Physical medicine rehabilitation      Chief complaint:   Chief Complaint   Patient presents with   • Follow-Up     Head pain         HISTORY    Please see new patient note dated  by Dr Dejesus,  for more details.     HPI  Patient identification: Jon Garcia 68 y.o. female  With Diagnoses of Spondylosis of cervical region without myelopathy or radiculopathy, Right carpal tunnel syndrome, Arthritis of right knee, Arthritis of left knee, and Skin abnormality right chest. were pertinent to this visit.     The patient is overall very happy with her pain relief in her neck.  She does still have some pain in the right side of the neck which is usually low grade 1-2/10 in intensity with greater than 70% pain relief overall and not having any headaches.  She takes Flexeril intermittently and she does get some aching pains in the right side of the neck but these are significantly improved when compared to preprocedure.      ROS Red Flags :   Fever, Chills, Sweats: Denies  Involuntary Weight Loss: Denies  Bowel/Bladder Incontinence: Denies  Saddle Anesthesia: Denies        PMHx:   History reviewed. No pertinent past medical history.    PSHx:   Past Surgical History:   Procedure Laterality Date   • HYSTERECTOMY, TOTAL ABDOMINAL  7/2002    Still has cervix. Due to fibroids   • HERNIA REPAIR     • TONSILLECTOMY      Child       Family history   Family History   Problem Relation Age of Onset   • Stroke Mother    • Hypertension Mother    • Other Father         glaucoma   • Arthritis Father         knee/hip replacement   • Hypertension Brother    • Cancer Brother 54        Pancreatic   • Hypertension Brother    • Lung Disease Brother         asthma   • Hypertension Brother    • Arthritis Brother         knee replacement         Medications:   Outpatient Medications Marked as Taking for the 8/29/19 encounter (Office Visit) with Wallace Dejesus M.D.   Medication Sig  Dispense Refill   • Diclofenac Sodium 1 % Gel Apply 3 g to skin as directed 4 times a day as needed (pain). 450 g 3   • cyclobenzaprine (FLEXERIL) 5 MG tablet Take 1-2 Tabs by mouth 3 times a day as needed for Severe Pain or Muscle Spasms. 90 Tab 0   • glucosamine Sulfate 500 MG Cap Take 500 mg by mouth 3 times a day, with meals.     • estradiol (ESTRACE) 0.5 MG tablet Take 1 Tab by mouth every day. 90 Tab 1   • valACYclovir (VALTREX) 500 MG Tab Take 1 Tab by mouth 2 times a day. 40 Tab 2   • BIOTIN PO Take  by mouth.     • Multiple Vitamins-Minerals (MULTIVITAMIN PO) Take  by mouth.     • CALCIUM PO Take  by mouth.     • vitamin D (CHOLECALCIFEROL) 1000 UNIT Tab Take 2,000 Units by mouth every day.          Current Outpatient Medications on File Prior to Visit   Medication Sig Dispense Refill   • cyclobenzaprine (FLEXERIL) 5 MG tablet Take 1-2 Tabs by mouth 3 times a day as needed for Severe Pain or Muscle Spasms. 90 Tab 0   • glucosamine Sulfate 500 MG Cap Take 500 mg by mouth 3 times a day, with meals.     • estradiol (ESTRACE) 0.5 MG tablet Take 1 Tab by mouth every day. 90 Tab 1   • valACYclovir (VALTREX) 500 MG Tab Take 1 Tab by mouth 2 times a day. 40 Tab 2   • BIOTIN PO Take  by mouth.     • Multiple Vitamins-Minerals (MULTIVITAMIN PO) Take  by mouth.     • CALCIUM PO Take  by mouth.     • vitamin D (CHOLECALCIFEROL) 1000 UNIT Tab Take 2,000 Units by mouth every day.     • fluticasone (FLONASE) 50 MCG/ACT nasal spray Spray 2 Sprays in nose every day. Each Nostril (Patient not taking: Reported on 7/18/2019) 16 g 11     No current facility-administered medications on file prior to visit.          Allergies:   Allergies   Allergen Reactions   • Ceftin [Cefuroxime Axetil]      Pruritis       Social Hx:   Social History     Socioeconomic History   • Marital status:      Spouse name: Not on file   • Number of children: Not on file   • Years of education: Not on file   • Highest education level: Not on file    Occupational History   • Not on file   Social Needs   • Financial resource strain: Not on file   • Food insecurity:     Worry: Not on file     Inability: Not on file   • Transportation needs:     Medical: Not on file     Non-medical: Not on file   Tobacco Use   • Smoking status: Former Smoker     Packs/day: 0.50     Years: 12.00     Pack years: 6.00     Types: Cigarettes     Last attempt to quit: 10/7/1982     Years since quittin.9   • Smokeless tobacco: Never Used   • Tobacco comment: Started in    Substance and Sexual Activity   • Alcohol use: Yes     Alcohol/week: 0.6 oz     Types: 1 Glasses of wine per week     Comment: 1-2 wine with dinner 2-3 times a week.   • Drug use: No     Comment: As youth   • Sexual activity: Yes     Partners: Male     Birth control/protection: Post-Menopausal   Lifestyle   • Physical activity:     Days per week: Not on file     Minutes per session: Not on file   • Stress: Not on file   Relationships   • Social connections:     Talks on phone: Not on file     Gets together: Not on file     Attends Tenriism service: Not on file     Active member of club or organization: Not on file     Attends meetings of clubs or organizations: Not on file     Relationship status: Not on file   • Intimate partner violence:     Fear of current or ex partner: Not on file     Emotionally abused: Not on file     Physically abused: Not on file     Forced sexual activity: Not on file   Other Topics Concern   •  Service No   • Blood Transfusions No   • Caffeine Concern No   • Occupational Exposure No   • Hobby Hazards No   • Sleep Concern No   • Stress Concern No   • Weight Concern No   • Special Diet No   • Back Care No   • Exercise Yes   • Bike Helmet No   • Seat Belt Yes   • Self-Exams Yes   Social History Narrative   • Not on file         EXAMINATION     Physical Exam:   Vitals: /62 (BP Location: Right arm, Patient Position: Sitting, BP Cuff Size: Adult)   Pulse 86   Temp 36.7 °C  "(98 °F) (Temporal)   Ht 1.575 m (5' 2\")   Wt 53.8 kg (118 lb 9.7 oz)   SpO2 97%     Constitutional:   Body Habitus: Body mass index is 21.69 kg/m².  Cooperation: Fully cooperates with exam  Appearance: Well-groomed no disheveled    Respiratory-  breathing comfortable on room air, no audible wheezing  Cardiovascular- capillary refills less than 2 seconds. No lower extremity edema is noted.   Psychiatric- alert and oriented ×3. Normal affect.    MSK: -Strength 5/5 in the bilateral upper extremities.  Sensation is intact in the bilateral upper extremities.  No tenderness palpation throughout the cervical spine.  Full range of motion of the cervical spine.      MEDICAL DECISION MAKING    DATA    Labs:   Lab Results   Component Value Date/Time    SODIUM 139 03/13/2018 10:02 AM    POTASSIUM 4.5 03/13/2018 10:02 AM    CHLORIDE 106 03/13/2018 10:02 AM    CO2 26 03/13/2018 10:02 AM    GLUCOSE 90 03/13/2018 10:02 AM    BUN 18 03/13/2018 10:02 AM    CREATININE 0.72 03/13/2018 10:02 AM        No results found for: PROTHROMBTM, INR     No results found for: WBC, RBC, HEMOGLOBIN, HEMATOCRIT, MCV, MCH, MCHC, MPV, NEUTSPOLYS, LYMPHOCYTES, MONOCYTES, EOSINOPHILS, BASOPHILS, HYPOCHROMIA, ANISOCYTOSIS     No results found for: HBA1C       Imaging:   I personally reviewed following images      I reviewed the following radiology reports                                 Results for orders placed during the hospital encounter of 03/25/19   MR-CERVICAL SPINE-W/O    Impression 1.  Multilevel degenerative disc disease and facet degeneration. No central canal narrowing. There is mild left neural foraminal narrowing at C4-5 and C6-7.    2.  Mild kyphotic deformity.    3.  Mild multilevel degenerative subluxation.                                                                                                                                                            DIAGNOSIS   Visit Diagnoses     ICD-10-CM   1. Spondylosis of cervical region " without myelopathy or radiculopathy M47.812   2. Right carpal tunnel syndrome G56.01   3. Arthritis of right knee M17.11   4. Arthritis of left knee M17.10   5. Skin abnormality right chest. L98.9         ASSESSMENT and PLAN:     Jon Garcia 68 y.o. female      Jon was seen today for follow-up.    Diagnoses and all orders for this visit:    Spondylosis of cervical region without myelopathy or radiculopathy  Comments:  significantly improved. notes 70% improvement after the RF  Orders:  -     Diclofenac Sodium 1 % Gel; Apply 3 g to skin as directed 4 times a day as needed (pain).    Right carpal tunnel syndrome  Comments:  improved after surgery  Orders:  -     Diclofenac Sodium 1 % Gel; Apply 3 g to skin as directed 4 times a day as needed (pain).    Arthritis of right knee  -     Diclofenac Sodium 1 % Gel; Apply 3 g to skin as directed 4 times a day as needed (pain).    Arthritis of left knee  Comments:  we discussed genicular nerve blocks for if she fails Visco supplementation from outside physician  Orders:  -     Diclofenac Sodium 1 % Gel; Apply 3 g to skin as directed 4 times a day as needed (pain).    Skin abnormality right chest.  Comments:   no signs of infection.  patient concerned for skin cancer. dermatology referral placed. father had skin cancer.   Orders:  -     REFERRAL TO DERMATOLOGY            Follow up: 1 year      Thank you for allowing me to participate in the care of this patient. If you have any questions please not hesitate to contact me.        Please note that this dictation was created using voice recognition software. I have made every reasonable attempt to correct obvious errors but there may be errors of grammar and content that I may have overlooked prior to finalization of this note.      Wallace Dejesus MD  Interventional Spine and Sports Physiatry  Physical Medicine and Rehabilitation  [facility]  8/29/2019  12:50 PM

## 2019-09-13 ENCOUNTER — PATIENT MESSAGE (OUTPATIENT)
Dept: PHYSICAL MEDICINE AND REHAB | Facility: MEDICAL CENTER | Age: 69
End: 2019-09-13

## 2019-09-13 DIAGNOSIS — M79.10 MYALGIA: ICD-10-CM

## 2019-09-13 DIAGNOSIS — G89.29 CHRONIC NECK PAIN: ICD-10-CM

## 2019-09-13 DIAGNOSIS — M47.812 SPONDYLOSIS OF CERVICAL REGION WITHOUT MYELOPATHY OR RADICULOPATHY: ICD-10-CM

## 2019-09-13 DIAGNOSIS — M54.2 CHRONIC NECK PAIN: ICD-10-CM

## 2019-10-01 ENCOUNTER — OFFICE VISIT (OUTPATIENT)
Dept: MEDICAL GROUP | Facility: PHYSICIAN GROUP | Age: 69
End: 2019-10-01
Payer: MEDICARE

## 2019-10-01 VITALS
SYSTOLIC BLOOD PRESSURE: 122 MMHG | BODY MASS INDEX: 22.08 KG/M2 | HEART RATE: 68 BPM | DIASTOLIC BLOOD PRESSURE: 80 MMHG | HEIGHT: 62 IN | TEMPERATURE: 99.5 F | OXYGEN SATURATION: 97 % | RESPIRATION RATE: 14 BRPM | WEIGHT: 120 LBS

## 2019-10-01 DIAGNOSIS — Z23 NEED FOR VACCINATION: ICD-10-CM

## 2019-10-01 DIAGNOSIS — M54.2 CHRONIC NECK PAIN: ICD-10-CM

## 2019-10-01 DIAGNOSIS — M25.531 RIGHT WRIST PAIN: ICD-10-CM

## 2019-10-01 DIAGNOSIS — G89.29 CHRONIC NECK PAIN: ICD-10-CM

## 2019-10-01 DIAGNOSIS — E89.40 POST HYSTERECTOMY MENOPAUSE SYNDROME: ICD-10-CM

## 2019-10-01 DIAGNOSIS — Z90.710 POST HYSTERECTOMY MENOPAUSE SYNDROME: ICD-10-CM

## 2019-10-01 DIAGNOSIS — M17.12 PRIMARY OSTEOARTHRITIS OF LEFT KNEE: ICD-10-CM

## 2019-10-01 PROCEDURE — G0009 ADMIN PNEUMOCOCCAL VACCINE: HCPCS | Performed by: FAMILY MEDICINE

## 2019-10-01 PROCEDURE — G0008 ADMIN INFLUENZA VIRUS VAC: HCPCS | Performed by: FAMILY MEDICINE

## 2019-10-01 PROCEDURE — 90662 IIV NO PRSV INCREASED AG IM: CPT | Performed by: FAMILY MEDICINE

## 2019-10-01 PROCEDURE — 99214 OFFICE O/P EST MOD 30 MIN: CPT | Mod: 25 | Performed by: FAMILY MEDICINE

## 2019-10-01 PROCEDURE — 90732 PPSV23 VACC 2 YRS+ SUBQ/IM: CPT | Performed by: FAMILY MEDICINE

## 2019-10-01 RX ORDER — ESTRADIOL 0.5 MG/1
0.5 TABLET ORAL DAILY
Qty: 90 TAB | Refills: 1 | Status: SHIPPED | OUTPATIENT
Start: 2019-10-01 | End: 2019-10-02 | Stop reason: SDUPTHER

## 2019-10-01 RX ORDER — TRAMADOL HYDROCHLORIDE 50 MG/1
TABLET ORAL
COMMUNITY
Start: 2019-08-02 | End: 2019-09-30

## 2019-10-02 DIAGNOSIS — Z90.710 POST HYSTERECTOMY MENOPAUSE SYNDROME: ICD-10-CM

## 2019-10-02 DIAGNOSIS — E89.40 POST HYSTERECTOMY MENOPAUSE SYNDROME: ICD-10-CM

## 2019-10-02 RX ORDER — ESTRADIOL 0.5 MG/1
0.5 TABLET ORAL DAILY
Qty: 90 TAB | Refills: 1 | Status: SHIPPED | OUTPATIENT
Start: 2019-10-02

## 2019-10-02 NOTE — PROGRESS NOTES
Chief Complaint   Patient presents with   • Immunizations   • Neck Pain     fv    • Knee Pain     lft knee pain       HISTORY OF PRESENT ILLNESS: Patient is a 68 y.o. female established patient here today for the following concerns:    1. Need for vaccination  Due for flu vaccine.     2. Post hysterectomy menopause syndrome  Here today to discuss her medications.  Currently taking 0.5 mg of estradiol about every other day or so.  Denies any hot flashes or mood swings.  She is interested in decreasing further unless they are conferring a benefit for her bone health.     3. Primary osteoarthritis of left knee  Has been working with ortho on her left knee arthritis.  She has had cortisone shots without much improvement and did have synvisc with some improvement.  She report that her ortho had mentioned next option was a knee replacement.  Here today to see if having additional joint injections was an option.  Also hoping to get an opinion with her brother's ortho in [place] when she visits.  Pain if typically medial and will radiate down into the medial calf and and medial thigh.  She reports that often placing her foot over the edge of the bed seems to relieve some of the pressure.  I    4. Chronic neck pain  Is now in PT for the chronic neck pain.  Has had a few injections and ablations done.  She reports much improvement with PT, but still getting some catching on rotation that will stop her in her tracks. No limb weakness, numbness or tingling.  Still working with Dr. Dejesus on her symptoms as well.     5. Right wrist pain  Lastly, last year had carpal tunnel release surgery on the right wrist.  Reports postop for 3 months was great and then had a massage on the wrist and developed some swelling and return of numbness and a tender spot on the palmar surface of the wrist and hand.  No other trauma.  Using ibuprofen for this and her knee and neck.  Has follow up with hand surgeon but not for another month.        Past Medical, Social, and Family history reviewed and updated in EPIC    Allergies:Ceftin [cefuroxime axetil]    Current Outpatient Medications   Medication Sig Dispense Refill   • estradiol (ESTRACE) 0.5 MG tablet Take 1 Tab by mouth every day. 90 Tab 1   • Diclofenac Sodium 1 % Gel Apply 3 g to skin as directed 4 times a day as needed (pain). 450 g 3   • cyclobenzaprine (FLEXERIL) 5 MG tablet Take 1-2 Tabs by mouth 3 times a day as needed for Severe Pain or Muscle Spasms. 90 Tab 0   • valACYclovir (VALTREX) 500 MG Tab Take 1 Tab by mouth 2 times a day. (Patient taking differently: Take 500 mg by mouth 2 times a day. Indications: Herpes Simplex Infection) 40 Tab 2   • BIOTIN PO Take  by mouth.     • Multiple Vitamins-Minerals (MULTIVITAMIN PO) Take  by mouth.     • CALCIUM PO Take  by mouth.     • vitamin D (CHOLECALCIFEROL) 1000 UNIT Tab Take 2,000 Units by mouth every day.     • glucosamine Sulfate 500 MG Cap Take 500 mg by mouth 3 times a day, with meals.     • fluticasone (FLONASE) 50 MCG/ACT nasal spray Spray 2 Sprays in nose every day. Each Nostril (Patient not taking: Reported on 7/18/2019) 16 g 11     No current facility-administered medications for this visit.          ROS:  Review of Systems   Constitutional: Negative for fever, chills, weight loss and malaise/fatigue.   HENT: Negative for ear pain, nosebleeds, congestion, sore throat and neck pain.    Eyes: Negative for blurred vision.   Respiratory: Negative for cough, sputum production, shortness of breath and wheezing.    Cardiovascular: Negative for chest pain, palpitations,  and leg swelling.   Gastrointestinal: Negative for heartburn, nausea, vomiting, diarrhea and abdominal pain.   Genitourinary: Negative for dysuria, urgency and frequency.   Musculoskeletal: Negative for myalgias, back pain and joint pain.   Skin: Negative for rash and itching.   Neurological: Negative for dizziness, tingling, tremors, sensory change, focal weakness and  "headaches.   Endo/Heme/Allergies: Does not bruise/bleed easily.   Psychiatric/Behavioral: Negative for depression, anxiety, suicidal ideas, insomnia and memory loss.      Exam:  /80   Pulse 68   Temp 37.5 °C (99.5 °F)   Resp 14   Ht 1.575 m (5' 2\")   Wt 54.4 kg (120 lb)   SpO2 97%     General:  Well nourished, well developed in NAD  Head is grossly normal.  Neck: Supple without JVD   Pulmonary:  Normal effort.   Cardiovascular: Regular rate  Extremities: no clubbing, cyanosis, or edema.  Psych: affect appropriate      Please note that this dictation was created using voice recognition software. I have made every reasonable attempt to correct obvious errors, but I expect that there are errors of grammar and possibly content that I did not discover before finalizing the note.    Assessment/Plan:  1. Need for vaccination  - Pneumoccocal Polysaccharide Vaccine 23-Valent =>3yo SQ/IM  - INFLUENZA VACCINE, HIGH DOSE (65+ ONLY)    2. Post hysterectomy menopause syndrome  - estradiol (ESTRACE) 0.5 MG tablet; Take 1 Tab by mouth every day.  Dispense: 90 Tab; Refill: 1  Recommend continuing on weaning off.  Recommend taking every other to every 3rd day.  If tolerated may take twice per week and stop.      3. Primary osteoarthritis of left knee  May continue with synvisc if getting benefit.    May consider second opinion ad keri if not comfortable    4. Chronic neck pain  Continue PT, discussed likely facet arthropathy causing the catching.      5. Right wrist pain  Trial of ice to the area, stop massage as maybe this is aggrevating it.     Follow up as needed or in 6 months.         "

## 2019-10-02 NOTE — TELEPHONE ENCOUNTER
----- Message from Jon Garcia sent at 10/2/2019 11:26 AM PDT -----  Regarding: Prescription Question  Contact: [phone]  I am so sorry but I should have reminded you to send my estradiol prescription to [facility] mail order pharmacy. Would you mind doing that at your convenience, please? I do still have enough pills to last a couple weeks on the current administration schedule of every 3 days ;)    Thanks so much. And I didn't get a chance to ask how the family is doing but I hope all is great!    Blake

## 2019-10-14 DIAGNOSIS — M54.2 CHRONIC NECK PAIN: ICD-10-CM

## 2019-10-14 DIAGNOSIS — G89.29 CHRONIC NECK PAIN: ICD-10-CM

## 2019-10-14 DIAGNOSIS — M47.812 SPONDYLOSIS OF CERVICAL REGION WITHOUT MYELOPATHY OR RADICULOPATHY: ICD-10-CM

## 2019-10-14 DIAGNOSIS — M62.838 MUSCLE SPASM: ICD-10-CM

## 2019-10-14 RX ORDER — CYCLOBENZAPRINE HCL 5 MG
5-10 TABLET ORAL 3 TIMES DAILY PRN
Qty: 90 TAB | Refills: 0 | Status: SHIPPED | OUTPATIENT
Start: 2019-10-14 | End: 2021-09-16

## 2019-10-14 NOTE — TELEPHONE ENCOUNTER
Was the patient seen in the last year in this department? Yes    Does patient have an active prescription for medications requested? Yes    Received Request Via: Pharmacy       cyclobenzaprine (FLEXERIL) 5 MG tablet

## 2020-02-13 ENCOUNTER — OFFICE VISIT (OUTPATIENT)
Dept: PHYSICAL MEDICINE AND REHAB | Facility: MEDICAL CENTER | Age: 70
End: 2020-02-13
Payer: MEDICARE

## 2020-02-13 VITALS
DIASTOLIC BLOOD PRESSURE: 72 MMHG | WEIGHT: 118 LBS | TEMPERATURE: 97.8 F | OXYGEN SATURATION: 99 % | BODY MASS INDEX: 21.71 KG/M2 | HEART RATE: 85 BPM | SYSTOLIC BLOOD PRESSURE: 128 MMHG | HEIGHT: 62 IN

## 2020-02-13 DIAGNOSIS — M54.2 CHRONIC NECK PAIN: ICD-10-CM

## 2020-02-13 DIAGNOSIS — G89.29 CHRONIC NECK PAIN: ICD-10-CM

## 2020-02-13 DIAGNOSIS — M47.812 SPONDYLOSIS OF CERVICAL REGION WITHOUT MYELOPATHY OR RADICULOPATHY: ICD-10-CM

## 2020-02-13 DIAGNOSIS — G56.01 RIGHT CARPAL TUNNEL SYNDROME: ICD-10-CM

## 2020-02-13 DIAGNOSIS — M25.462 EFFUSION OF LEFT KNEE: ICD-10-CM

## 2020-02-13 DIAGNOSIS — M17.10 ARTHRITIS OF KNEE: ICD-10-CM

## 2020-02-13 PROCEDURE — 76882 US LMTD JT/FCL EVL NVASC XTR: CPT | Performed by: PHYSICAL MEDICINE & REHABILITATION

## 2020-02-13 PROCEDURE — 99214 OFFICE O/P EST MOD 30 MIN: CPT | Performed by: PHYSICAL MEDICINE & REHABILITATION

## 2020-02-13 ASSESSMENT — PATIENT HEALTH QUESTIONNAIRE - PHQ9: CLINICAL INTERPRETATION OF PHQ2 SCORE: 0

## 2020-02-13 ASSESSMENT — PAIN SCALES - GENERAL: PAINLEVEL: 1=MINIMAL PAIN

## 2020-02-13 NOTE — Clinical Note
Please request  records of [facility] and [facility]. Including images

## 2020-02-13 NOTE — PROGRESS NOTES
Follow up patient note  Interventional spine and sports physiatry, Physical medicine rehabilitation      Chief complaint:   Chief Complaint   Patient presents with   • Follow-Up     Knee pain         HISTORY    Please see new patient note dated  by Dr Dejesus,  for more details.     HPI  Patient identification: Jon Garcia 69 y.o. female  With Diagnoses of Chronic neck pain, Spondylosis of cervical region without myelopathy or radiculopathy, Right carpal tunnel syndrome, Effusion of left knee, and Arthritis of left knee were pertinent to this visit.     The patient has had several consultations for left knee pain and arthritis and pains have been worsening since September 2019.  She has seen Dr. Delgadillo as well as Dr. Kumar.  X-rays and MRIs have been done of the knee.  Knee replacement has been offered to her but she wishes to avoid knee replacement if possible especially given multiple trips upcoming in June 2020.  The pain is worse in the medial aspect of the knee.  Sharp in quality, nonradiating, intermittent.  Worse at night. Mild pain at rest. Moderate pain with doing stairs especially going down stairs.     She reports having a knee injection done by the PA at Dr. Kumar's office on December 27, 2019 with improvement of her pain after the injection.    Neck pain and shoulder pain are stable.     ROS Red Flags :   Fever, Chills, Sweats: Denies  Involuntary Weight Loss: Denies  Bowel/Bladder Incontinence: Denies  Saddle Anesthesia: Denies        PMHx:   History reviewed. No pertinent past medical history.    PSHx:   Past Surgical History:   Procedure Laterality Date   • HYSTERECTOMY, TOTAL ABDOMINAL  7/2002    Still has cervix. Due to fibroids   • HERNIA REPAIR     • TONSILLECTOMY      Child       Family history   Family History   Problem Relation Age of Onset   • Stroke Mother    • Hypertension Mother    • Other Father         glaucoma   • Arthritis Father         knee/hip replacement   • Hypertension  Brother    • Cancer Brother 54        Pancreatic   • Hypertension Brother    • Lung Disease Brother         asthma   • Hypertension Brother    • Arthritis Brother         knee replacement         Medications:   Outpatient Medications Marked as Taking for the 2/13/20 encounter (Office Visit) with Wallace Dejesus M.D.   Medication Sig Dispense Refill   • Naproxen (NAPROSYN PO) Take  by mouth. 1-2 tabs As need for pain     • cyclobenzaprine (FLEXERIL) 5 MG tablet Take 1-2 Tabs by mouth 3 times a day as needed for Severe Pain or Muscle Spasms. 90 Tab 0   • valACYclovir (VALTREX) 500 MG Tab Take 1 Tab by mouth 2 times a day. (Patient taking differently: Take 500 mg by mouth 2 times a day. Indications: Herpes Simplex Infection) 40 Tab 2   • BIOTIN PO Take  by mouth.     • Multiple Vitamins-Minerals (MULTIVITAMIN PO) Take  by mouth.     • CALCIUM PO Take  by mouth.     • vitamin D (CHOLECALCIFEROL) 1000 UNIT Tab Take 2,000 Units by mouth every day.          Current Outpatient Medications on File Prior to Visit   Medication Sig Dispense Refill   • Naproxen (NAPROSYN PO) Take  by mouth. 1-2 tabs As need for pain     • cyclobenzaprine (FLEXERIL) 5 MG tablet Take 1-2 Tabs by mouth 3 times a day as needed for Severe Pain or Muscle Spasms. 90 Tab 0   • valACYclovir (VALTREX) 500 MG Tab Take 1 Tab by mouth 2 times a day. (Patient taking differently: Take 500 mg by mouth 2 times a day. Indications: Herpes Simplex Infection) 40 Tab 2   • BIOTIN PO Take  by mouth.     • Multiple Vitamins-Minerals (MULTIVITAMIN PO) Take  by mouth.     • CALCIUM PO Take  by mouth.     • vitamin D (CHOLECALCIFEROL) 1000 UNIT Tab Take 2,000 Units by mouth every day.     • estradiol (ESTRACE) 0.5 MG tablet Take 1 Tab by mouth every day. (Patient not taking: Reported on 2/13/2020) 90 Tab 1   • Diclofenac Sodium 1 % Gel Apply 3 g to skin as directed 4 times a day as needed (pain). (Patient not taking: Reported on 2/13/2020) 450 g 3     No current  facility-administered medications on file prior to visit.          Allergies:   Allergies   Allergen Reactions   • Ceftin [Cefuroxime Axetil]      Pruritis       Social Hx:   Social History     Socioeconomic History   • Marital status:      Spouse name: Not on file   • Number of children: Not on file   • Years of education: Not on file   • Highest education level: Not on file   Occupational History   • Not on file   Social Needs   • Financial resource strain: Not on file   • Food insecurity     Worry: Not on file     Inability: Not on file   • Transportation needs     Medical: Not on file     Non-medical: Not on file   Tobacco Use   • Smoking status: Former Smoker     Packs/day: 0.50     Years: 12.00     Pack years: 6.00     Types: Cigarettes     Last attempt to quit: 10/7/1982     Years since quittin.3   • Smokeless tobacco: Never Used   • Tobacco comment: Started in    Substance and Sexual Activity   • Alcohol use: Yes     Alcohol/week: 0.6 oz     Types: 1 Glasses of wine per week     Comment: 1-2 wine with dinner 2-3 times a week.   • Drug use: Yes     Types: Marijuana     Comment: As youth   • Sexual activity: Yes     Partners: Male     Birth control/protection: Post-Menopausal   Lifestyle   • Physical activity     Days per week: Not on file     Minutes per session: Not on file   • Stress: Not on file   Relationships   • Social connections     Talks on phone: Not on file     Gets together: Not on file     Attends Jain service: Not on file     Active member of club or organization: Not on file     Attends meetings of clubs or organizations: Not on file     Relationship status: Not on file   • Intimate partner violence     Fear of current or ex partner: Not on file     Emotionally abused: Not on file     Physically abused: Not on file     Forced sexual activity: Not on file   Other Topics Concern   •  Service No   • Blood Transfusions No   • Caffeine Concern No   • Occupational  "Exposure No   • Hobby Hazards No   • Sleep Concern No   • Stress Concern No   • Weight Concern No   • Special Diet No   • Back Care No   • Exercise Yes   • Bike Helmet No   • Seat Belt Yes   • Self-Exams Yes   Social History Narrative   • Not on file         EXAMINATION     Physical Exam:   Vitals: /72 (BP Location: Right arm, Patient Position: Sitting, BP Cuff Size: Adult)   Pulse 85   Temp 36.6 °C (97.8 °F) (Temporal)   Ht 1.575 m (5' 2\")   Wt 53.5 kg (118 lb)   SpO2 99%     Constitutional:   Body Habitus: Body mass index is 21.58 kg/m².  Cooperation: Fully cooperates with exam  Appearance: Well-groomed no disheveled    Respiratory-  breathing comfortable on room air, no audible wheezing  Cardiovascular- capillary refills less than 2 seconds. No lower extremity edema is noted.   Psychiatric- alert and oriented ×3. Normal affect.    MSK: -  left Knee:   Inspection no swelling, rashes or deformities,   Palpation mild TTP of the medial meniscus. no significant tenderness to palpation throughout the knee including the  lateral joint line, quadriceps tendon, patellar tendon, patellar, medial patellar facets, lateral patellar facets, tibial tuberosity, fibular head.  Range of motion normal range of motion in flexion and extension  Special tests:   Varus stress tests: Negative  Valgus stress tests: Negative  Lockman's test: Negative  Anterior drawer: Negative  Posterior drawer: Negative  Ruth's: negative            MEDICAL DECISION MAKING    DATA    Labs:   Lab Results   Component Value Date/Time    SODIUM 139 03/13/2018 10:02 AM    POTASSIUM 4.5 03/13/2018 10:02 AM    CHLORIDE 106 03/13/2018 10:02 AM    CO2 26 03/13/2018 10:02 AM    GLUCOSE 90 03/13/2018 10:02 AM    BUN 18 03/13/2018 10:02 AM    CREATININE 0.72 03/13/2018 10:02 AM        No results found for: PROTHROMBTM, INR     No results found for: WBC, RBC, HEMOGLOBIN, HEMATOCRIT, MCV, MCH, MCHC, MPV, NEUTSPOLYS, LYMPHOCYTES, MONOCYTES, EOSINOPHILS, " BASOPHILS, HYPOCHROMIA, ANISOCYTOSIS     No results found for: HBA1C       Imaging:   I personally reviewed following images      I reviewed the following radiology reports                                 Results for orders placed during the hospital encounter of 03/25/19   MR-CERVICAL SPINE-W/O    Impression 1.  Multilevel degenerative disc disease and facet degeneration. No central canal narrowing. There is mild left neural foraminal narrowing at C4-5 and C6-7.    2.  Mild kyphotic deformity.    3.  Mild multilevel degenerative subluxation.                                                                                                                                                            DIAGNOSIS   Visit Diagnoses     ICD-10-CM   1. Chronic neck pain M54.2    G89.29   2. Spondylosis of cervical region without myelopathy or radiculopathy M47.812   3. Right carpal tunnel syndrome G56.01   4. Effusion of left knee M25.462   5. Arthritis of left knee M17.10         ASSESSMENT and PLAN:     Jon CHEN Jose 69 y.o. female      Jon was seen today for follow-up.    Diagnoses and all orders for this visit:    Chronic neck pain  Comments:  stable    Spondylosis of cervical region without myelopathy or radiculopathy  Comments:  stable    Right carpal tunnel syndrome  Comments:  stable    Effusion of left knee    Arthritis of left knee  Comments:  symptoms improved with previous knee injection done at [facility] in dec 2019.       I requested the outside records of [place] [facility] and [facility] orth[facility]ics.     Continue flexeril PRN    The patient did have significant benefit after a left knee injection done at [place] [facility] in December 2019 as above.  I would consider the patient for repeat injection prior to her trip which is in June 2020.  We will plan to follow-up in May 2020 for consideration of an intra-articular joint injection as well as aspiration.  We also discussed genicular nerve blocks and  potential ablation which can be considered.    Follow up: may 2020      Thank you for allowing me to participate in the care of this patient. If you have any questions please not hesitate to contact me.        Please note that this dictation was created using voice recognition software. I have made every reasonable attempt to correct obvious errors but there may be errors of grammar and content that I may have overlooked prior to finalization of this note.      Wallace Dejesus MD  Interventional Spine and Sports Physiatry  Physical Medicine and Rehabilitation  [facility]

## 2020-03-02 ENCOUNTER — HOSPITAL ENCOUNTER (OUTPATIENT)
Dept: RADIOLOGY | Facility: MEDICAL CENTER | Age: 70
End: 2020-03-02
Payer: MEDICARE

## 2020-03-17 RX ORDER — VALACYCLOVIR HYDROCHLORIDE 500 MG/1
500 TABLET, FILM COATED ORAL 2 TIMES DAILY
Qty: 40 TAB | Refills: 2 | Status: SHIPPED | OUTPATIENT
Start: 2020-03-17 | End: 2021-09-16

## 2020-03-18 ENCOUNTER — TELEPHONE (OUTPATIENT)
Dept: MEDICAL GROUP | Facility: PHYSICIAN GROUP | Age: 70
End: 2020-03-18

## 2020-03-18 NOTE — TELEPHONE ENCOUNTER
Called [facility] , they did not answer.     Needed to verify Rx. Pt states that pharmacy has not received Rx. Approved by our pharmacy team on 03/17/20.

## 2020-03-19 RX ORDER — VALACYCLOVIR HYDROCHLORIDE 500 MG/1
TABLET, FILM COATED ORAL
Qty: 40 TAB | Refills: 10 | OUTPATIENT
Start: 2020-03-19

## 2020-04-13 ENCOUNTER — HOSPITAL ENCOUNTER (OUTPATIENT)
Dept: RADIOLOGY | Facility: MEDICAL CENTER | Age: 70
End: 2020-04-13
Payer: MEDICARE

## 2020-05-18 ENCOUNTER — APPOINTMENT (OUTPATIENT)
Dept: PHYSICAL MEDICINE AND REHAB | Facility: MEDICAL CENTER | Age: 70
End: 2020-05-18
Payer: MEDICARE

## 2020-07-15 ENCOUNTER — OFFICE VISIT (OUTPATIENT)
Dept: MEDICAL GROUP | Facility: PHYSICIAN GROUP | Age: 70
End: 2020-07-15
Payer: MEDICARE

## 2020-07-15 VITALS
HEART RATE: 72 BPM | RESPIRATION RATE: 14 BRPM | HEIGHT: 62 IN | TEMPERATURE: 98.4 F | OXYGEN SATURATION: 96 % | WEIGHT: 121.4 LBS | BODY MASS INDEX: 22.34 KG/M2 | SYSTOLIC BLOOD PRESSURE: 122 MMHG | DIASTOLIC BLOOD PRESSURE: 82 MMHG

## 2020-07-15 DIAGNOSIS — M54.50 LUMBAR BACK PAIN: ICD-10-CM

## 2020-07-15 DIAGNOSIS — Z12.39 ENCOUNTER FOR SCREENING BREAST EXAMINATION: ICD-10-CM

## 2020-07-15 DIAGNOSIS — Z12.11 SCREENING FOR COLON CANCER: ICD-10-CM

## 2020-07-15 PROCEDURE — 99214 OFFICE O/P EST MOD 30 MIN: CPT | Performed by: FAMILY MEDICINE

## 2020-07-15 RX ORDER — TRAMADOL HYDROCHLORIDE 50 MG/1
TABLET ORAL
COMMUNITY

## 2020-07-15 RX ORDER — MELOXICAM 15 MG/1
TABLET ORAL
COMMUNITY

## 2020-07-15 RX ORDER — HYALURONATE SODIUM 16.8MG/2ML
2 SYRINGE (ML) INTRAARTICULAR
COMMUNITY

## 2020-07-15 RX ORDER — DIAZEPAM 5 MG/1
TABLET ORAL
COMMUNITY

## 2020-07-15 RX ORDER — FLUTICASONE PROPIONATE 50 MCG
SPRAY, SUSPENSION (ML) NASAL
COMMUNITY

## 2020-07-15 NOTE — PROGRESS NOTES
Chief Complaint   Patient presents with   • Back Pain   • Other     breast check       HISTORY OF PRESENT ILLNESS: Patient is a 69 y.o. female established patient here today for the following concerns:    Lumbar back pain  Reports some lumbosacral aching back pain that is worse at night in bed or in the morning upon awakening or when having to bend and lift her ailing dog.  Has also been a bit more sedentary.  She does have the left knee issues and favors it.  No radicular symptoms down the leg or into the groin.  Has been doing some stretching in the am before getting out of bed seems to help some.  Not using any OTC pain relievers for it.  Heating pad helps a little.      Due for colon cancer screening    Due for breast exam, no new concerns.  Has axillary cyst that derm is going to excise.        Past Medical, Social, and Family history reviewed and updated in EPIC    Allergies:Ceftin [cefuroxime axetil]    Current Outpatient Medications   Medication Sig Dispense Refill   • Sodium Hyaluronate, Viscosup, (GELSYN-3) 16.8 MG/2ML Solution Prefilled Syringe 2 mL.     • valACYclovir (VALTREX) 500 MG Tab Take 1 Tab by mouth 2 times a day. 40 Tab 2   • Naproxen (NAPROSYN PO) Take  by mouth. 1-2 tabs As need for pain     • cyclobenzaprine (FLEXERIL) 5 MG tablet Take 1-2 Tabs by mouth 3 times a day as needed for Severe Pain or Muscle Spasms. 90 Tab 0   • Diclofenac Sodium 1 % Gel Apply 3 g to skin as directed 4 times a day as needed (pain). 450 g 3   • BIOTIN PO Take  by mouth.     • Multiple Vitamins-Minerals (MULTIVITAMIN PO) Take  by mouth.     • CALCIUM PO Take  by mouth.     • vitamin D (CHOLECALCIFEROL) 1000 UNIT Tab Take 2,000 Units by mouth every day.     • diazePAM (VALIUM) 5 MG Tab diazepam 5 mg tablet     • fluticasone (FLONASE) 50 MCG/ACT nasal spray fluticasone propionate 50 mcg/actuation nasal spray,suspension     • meloxicam (MOBIC) 15 MG tablet meloxicam 15 mg tablet     • tramadol (ULTRAM) 50 MG Tab  "tramadol 50 mg tablet     • estradiol (ESTRACE) 0.5 MG tablet Take 1 Tab by mouth every day. (Patient not taking: Reported on 2/13/2020) 90 Tab 1     No current facility-administered medications for this visit.          ROS:  Review of Systems   Constitutional: Negative for fever, chills, weight loss and malaise/fatigue.   HENT: Negative for ear pain, nosebleeds, congestion, sore throat and neck pain.    Eyes: Negative for blurred vision.   Respiratory: Negative for cough, sputum production, shortness of breath and wheezing.    Cardiovascular: Negative for chest pain, palpitations,  and leg swelling.   Gastrointestinal: Negative for heartburn, nausea, vomiting, diarrhea and abdominal pain.   Genitourinary: Negative for dysuria, urgency and frequency.   Musculoskeletal: Negative for myalgias, +back pain and + joint pain.   Skin: Negative for rash and itching.   Neurological: Negative for dizziness, tingling, tremors, sensory change, focal weakness and headaches.   Endo/Heme/Allergies: Does not bruise/bleed easily.   Psychiatric/Behavioral: Negative for depression, anxiety, suicidal ideas, insomnia and memory loss.      Exam:  /82   Pulse 72   Temp 36.9 °C (98.4 °F)   Resp 14   Ht 1.575 m (5' 2\")   Wt 55.1 kg (121 lb 6.4 oz)   SpO2 96%     General:  Well nourished, well developed in NAD  Head is grossly normal.  Neck: Supple without JVD   Pulmonary:  Normal effort.   Cardiovascular: Regular rate  Extremities: no clubbing, cyanosis, or edema.  Psych: affect appropriate  MSK: full ROM, slight tenderness over SI joint. Full ROM of both hips.  Negative straight leg testing.    Breasts: normal appearance, no skin changes, no masses, nipple discharge, or LAD Right axilla with subcutaneous cyst noted    Please note that this dictation was created using voice recognition software. I have made every reasonable attempt to correct obvious errors, but I expect that there are errors of grammar and possibly content that " I did not discover before finalizing the note.    Assessment/Plan:  1. Lumbar back pain  Ok to resume NSAIDs occasionally for back and knee pain.  Will ask PT to evaluate and treat.  No indication for xrays at this time.  Suspect Facet arthropathy lower lumbar.  No radicular symptoms or weakness.  - REFERRAL TO PHYSICAL THERAPY Reason for Therapy: Eval/Treat/Report  Handicap placard ordered for both OA knee and lumbar back pain    2. Screening for colon cancer  - COLOGUARD (FIT DNA)    3. Encounter for screening breast examination  Normal CBE, check mammogram tomorrow.

## 2020-07-15 NOTE — ASSESSMENT & PLAN NOTE
Reports some lumbosacral aching back pain that is worse at night in bed or in the morning upon awakening or when having to bend and lift her ailing dog.  Has also been a bit more sedentary.  She does have the left knee issues and favors it.  No radicular symptoms down the leg or into the groin.  Has been doing some stretching in the am before getting out of bed seems to help some.  Not using any OTC pain relievers for it.  Heating pad helps a little.

## 2020-07-16 ENCOUNTER — HOSPITAL ENCOUNTER (OUTPATIENT)
Dept: RADIOLOGY | Facility: MEDICAL CENTER | Age: 70
End: 2020-07-16
Attending: FAMILY MEDICINE
Payer: MEDICARE

## 2020-07-16 DIAGNOSIS — Z12.31 VISIT FOR SCREENING MAMMOGRAM: ICD-10-CM

## 2020-07-16 PROCEDURE — 77067 SCR MAMMO BI INCL CAD: CPT

## 2020-08-07 ENCOUNTER — PHYSICAL THERAPY (OUTPATIENT)
Dept: PHYSICAL THERAPY | Facility: REHABILITATION | Age: 70
End: 2020-08-07
Attending: FAMILY MEDICINE
Payer: MEDICARE

## 2020-08-07 DIAGNOSIS — G89.29 CHRONIC RIGHT-SIDED LOW BACK PAIN WITHOUT SCIATICA: ICD-10-CM

## 2020-08-07 DIAGNOSIS — M54.50 CHRONIC RIGHT-SIDED LOW BACK PAIN WITHOUT SCIATICA: ICD-10-CM

## 2020-08-07 PROCEDURE — 97161 PT EVAL LOW COMPLEX 20 MIN: CPT

## 2020-08-07 PROCEDURE — 97110 THERAPEUTIC EXERCISES: CPT

## 2020-08-07 SDOH — ECONOMIC STABILITY: GENERAL: QUALITY OF LIFE: FAIR

## 2020-08-07 ASSESSMENT — ENCOUNTER SYMPTOMS
PAIN SCALE AT HIGHEST: 3
PAIN SCALE AT LOWEST: 0
PAIN SCALE: 1

## 2020-08-07 NOTE — OP THERAPY EVALUATION
Outpatient Physical Therapy  INITIAL EVALUATION    [facility] [place]  [address], [address]  [place] [address]  Phone:  [phone]  Fax:  [phone]    Date of Evaluation: 2020    Patient: Blake Garcia  YOB: 1950  MRN: [id number]     Referring Provider: Bertha Gómez M.D.  [address]  [address]  [place] [address]   Referring Diagnosis Lumbar back pain [M54.5]     Time Calculation    Start time: 1010  Stop time: 1110 Time Calculation (min): 60 minutes         Chief Complaint: Back Problem    Visit Diagnoses     ICD-10-CM   1. Chronic right-sided low back pain without sciatica  M54.5    G89.29         Subjective:   Quality of life:  Fair  Prior level of function:  New onset lower back pain  Pain:     Current pain ratin    At best pain ratin    At worst pain rating:  3  Activities of Daily Living:     Patient reported ADL status: Pain with lifting  Distracting with daily activities  Limiting more energetic activities  Patient Goals:     Patient goals for therapy:  Increased strength, decreased pain and increased motion    Patient is a 69 y.o. female that presents to therapy with L sided lower back pain. States that symptoms were insidious in onset. Reports the pain quality to be sharp/dull, constant and are primarily L sided lower back pain. Reports that symptoms now not changing. States that aggravating factors are sleep, lifting. States that easng factors are stretching.  Denies red flags.    No past medical history on file.  Past Surgical History:   Procedure Laterality Date   • HYSTERECTOMY, TOTAL ABDOMINAL  2002    Still has cervix. Due to fibroids   • HERNIA REPAIR     • TONSILLECTOMY      Child     Social History     Tobacco Use   • Smoking status: Former Smoker     Packs/day: 0.50     Years: 12.00     Pack years: 6.00     Types: Cigarettes     Quit date: 10/7/1982     Years since quittin.8   • Smokeless tobacco: Never Used   •  Tobacco comment: Started in 1970   Substance Use Topics   • Alcohol use: Yes     Alcohol/week: 0.6 oz     Types: 1 Glasses of wine per week     Comment: 1-2 wine with dinner 2-3 times a week.     Family and Occupational History     Socioeconomic History   • Marital status:      Spouse name: Not on file   • Number of children: Not on file   • Years of education: Not on file   • Highest education level: Not on file   Occupational History   • Not on file       Objective     Postural Observations  Seated posture: poor  Standing posture: poor        Neurological Testing     Reflexes   Left   Patellar (L4): normal (2+)  Achilles (S1): normal (2+)  Ankle clonus reflex: negative  Babinski sign: negative    Right   Patellar (L4): normal (2+)  Achilles (S1): normal (2+)  Ankle clonus reflex: negative  Babinski sign: negative    Myotome testing   Lumbar (left)   L1 (hip flexors): 4  L2 (hip flexors): 4  L3 (knee extensors): 5  L4 (ankle dorsiflexors): 5  L5 (great toe extension): 5  S1 (ankle plantar flexors): 5    Lumbar (right)   L1 (hip flexors): 4  L2 (hip flexors): 4  L3 (knee extensors): 5  L4 (ankle dorsiflexors): 5  L5 (great toe extension): 5  S1 (ankle plantar flexors): 5    Dermatome testing   Lumbar (left)   All left lumbar dermatomes intact    Lumbar (right)   All right lumbar dermatomes intact    Palpation   Left   Hypertonic in the lumbar paraspinals and quadratus lumborum.     Right   No palpable tenderness to the quadratus lumborum.   Hypertonic in the lumbar paraspinals.     Active Range of Motion     Lumbar   Flexion: Lumbar active flexion: 130deg.  Extension: Lumbar active extension: 42deg.  Left lateral flexion: Left lateral lumbar spine flexion: 38deg.  Right lateral flexion: Right lateral lumbar spine flexion: 35deg P.  Left Knee   Flexion: 138 degrees   Extension: 1 degrees     Strength:      Left Hip   Planes of Motion   Abduction: 3+  Adduction: 5    Right Hip   Planes of Motion   Abduction:  3+  Adduction: 5    Left Knee   Flexion: 4    Right Knee   Flexion: 4    Tests     Left Pelvic Girdle/Sacrum   Negative: sacral rotation.     Right Pelvic Girdle/Sacrum   Negative: sacral rotation.     Left Hip   Negative SI compression and SI distraction.   SLR: Negative.     Right Hip   Negative SI compression and SI distraction.   SLR: Negative.     Additional Tests Details  Traction (+)    Kenny LumbarTest     Lying repeated motions:   Repeat flexion in lying     Symptoms during testing: no effect    Symptoms after testing: no effect  Repeat extension in lying     Symptoms during testing: no effect    Symptoms after testing: no effect        Therapeutic Exercises (CPT 62681):     1. Basic tra, x5min    2. Clams, x20      Time-based treatments/modalities:    Physical Therapy Timed Treatment Charges  Therapeutic exercise minutes (CPT 27719): 10 minutes      Assessment, Response and Plan:   Impairments: abnormal or restricted ROM, activity intolerance, impaired physical strength and pain with function    Assessment details:  Patient presents with signs and symptoms consistent with functional instability vs lumbar spondylosis. Patient limitations include weakness, decreased ROM, and pain. Patient demonstrated no directional prefrence. Patient will benefit from skilled therapy to improve the aforementioned deficits and decrease further functional decline.   Prognosis: fair    Goals:   Short Term Goals:   1) Patient's hip abd strength will improve by a half muscle grade to facilitate improved standing tolerance.  2) Patient's symptoms will improve to facilitate rolling in bed with no symptoms.  Short term goal time span:  2-4 weeks      Long Term Goals:    1) Patient's syptoms will improve to allow for sit to stand transfers without an increase in pain. .    Long term goal time span:  6-8 weeks    Plan:   Therapy options:  Physical therapy treatment to continue  Planned therapy interventions:  E Stim Unattended  (CPT 71360), Hot or Cold Pack Therapy (CPT 59236), Manual Therapy (CPT 92730), Neuromuscular Re-education (CPT 79287) and Therapeutic Exercise (CPT 64185)  Frequency:  2x week  Duration in weeks:  6  Discussed with:  Patient      Functional Assessment Used  PT Functional Assessment Tool Used: LBDI  PT Functional Assessment Score: 4     Referring provider co-signature:  I have reviewed this plan of care and my co-signature certifies the need for services.    Certification Period: 08/07/2020 to  09/18/20    Physician Signature: ________________________________ Date: ______________

## 2020-08-08 ENCOUNTER — PATIENT MESSAGE (OUTPATIENT)
Dept: MEDICAL GROUP | Facility: PHYSICIAN GROUP | Age: 70
End: 2020-08-08

## 2020-08-12 ENCOUNTER — PHYSICAL THERAPY (OUTPATIENT)
Dept: PHYSICAL THERAPY | Facility: REHABILITATION | Age: 70
End: 2020-08-12
Attending: FAMILY MEDICINE
Payer: MEDICARE

## 2020-08-12 DIAGNOSIS — G89.29 CHRONIC RIGHT-SIDED LOW BACK PAIN WITHOUT SCIATICA: ICD-10-CM

## 2020-08-12 DIAGNOSIS — M54.50 CHRONIC RIGHT-SIDED LOW BACK PAIN WITHOUT SCIATICA: ICD-10-CM

## 2020-08-12 PROCEDURE — 97110 THERAPEUTIC EXERCISES: CPT

## 2020-08-12 NOTE — OP THERAPY DAILY TREATMENT
"  Outpatient Physical Therapy  DAILY TREATMENT     [facility] [place]  [address]  [place] [address]  Phone:  [phone]  Fax:  [phone]    Date: 08/12/2020    Patient: Blake Garcia  YOB: 1950  MRN: [id number]     Time Calculation    Start time: 1000  Stop time: 1030 Time Calculation (min): 30 minutes         Chief Complaint: Back Problem    Visit #: 2    SUBJECTIVE:  Patient reports that symptoms have been \"good\". States that overall she is good today.     OBJECTIVE:  Current objective measures:           Therapeutic Exercises (CPT 78524):     1. Nu Step, x10min    2. Seated on DD with LE lifts, x10      Therapeutic Exercise Summary:  Access Code: [email or web address]        Exercises Hooklying Transversus Abdominis Palpation - 10 reps - 2 sets - 1x daily - 7x weekly   Clamshell - 10 reps - 2 sets - 1x daily - 7x weekly   Bridge with Arms at Sides and Feet on Swiss Ball - 10 reps - 2 sets - 1x daily - 7x weekly   Beginner Bridge - 10 reps - 2 sets - 1x daily - 7x weekly         Time-based treatments/modalities:    Physical Therapy Timed Treatment Charges  Therapeutic exercise minutes (CPT 92457): 30 minutes      Pain rating (1-10) before treatment:  0  Pain rating (1-10) after treatment:  0    ASSESSMENT:   Response to treatment: Patient responded well to therapy with an overall increase in fatigue with no increase in pain.     PLAN/RECOMMENDATIONS:   Plan for treatment: therapy treatment to continue next visit.  Planned interventions for next visit: continue with current treatment.       "

## 2020-08-14 ENCOUNTER — PHYSICAL THERAPY (OUTPATIENT)
Dept: PHYSICAL THERAPY | Facility: REHABILITATION | Age: 70
End: 2020-08-14
Attending: FAMILY MEDICINE
Payer: MEDICARE

## 2020-08-14 DIAGNOSIS — M25.562 CHRONIC PAIN OF LEFT KNEE: ICD-10-CM

## 2020-08-14 DIAGNOSIS — G89.29 CHRONIC PAIN OF LEFT KNEE: ICD-10-CM

## 2020-08-14 DIAGNOSIS — G89.29 CHRONIC RIGHT-SIDED LOW BACK PAIN WITHOUT SCIATICA: ICD-10-CM

## 2020-08-14 DIAGNOSIS — M54.50 CHRONIC RIGHT-SIDED LOW BACK PAIN WITHOUT SCIATICA: ICD-10-CM

## 2020-08-14 PROCEDURE — 97110 THERAPEUTIC EXERCISES: CPT

## 2020-08-14 NOTE — OP THERAPY DAILY TREATMENT
Outpatient Physical Therapy  DAILY TREATMENT     [facility] Physical Therapy [place]  [address], [address]  [facility] [address]  Phone:  [phone]  Fax:  [phone]    Date: 08/14/2020    Patient: Blake Garcia  YOB: 1950  MRN: [id number]     Time Calculation    Start time: 1030  Stop time: 1100 Time Calculation (min): 30 minutes         Chief Complaint: Knee Problem and Back Problem    Visit #: 3    SUBJECTIVE:  Patient reports that symptoms have improved in terms of her back pain. Notes no pain at present.     OBJECTIVE:  Current objective measures:   Knee screen (+) OA  Noted minor patellar tracking issue  Trial strapping          Therapeutic Exercises (CPT 54733):     1. Nu Step, x10min    2. Seated on DD with LE lifts, x10      Therapeutic Exercise Summary:  Access Code: [email or web address]        Exercises Hooklying Transversus Abdominis Palpation - 10 reps - 2 sets - 1x daily - 7x weekly   Clamshell - 10 reps - 2 sets - 1x daily - 7x weekly   Bridge with Arms at Sides and Feet on Swiss Ball - 10 reps - 2 sets - 1x daily - 7x weekly   Beginner Bridge - 10 reps - 2 sets - 1x daily - 7x weekly         Time-based treatments/modalities:    Physical Therapy Timed Treatment Charges  Therapeutic exercise minutes (CPT 49009): 30 minutes      Pain rating (1-10) before treatment:  0  Pain rating (1-10) after treatment:  0    ASSESSMENT:   Response to treatment: Patient is responding well to the lumbar exercise program with decreased pain and improved daily function.     PLAN/RECOMMENDATIONS:   Plan for treatment: therapy treatment to continue next visit.  Planned interventions for next visit: continue with current treatment.

## 2020-08-19 ENCOUNTER — PHYSICAL THERAPY (OUTPATIENT)
Dept: PHYSICAL THERAPY | Facility: REHABILITATION | Age: 70
End: 2020-08-19
Attending: FAMILY MEDICINE
Payer: MEDICARE

## 2020-08-19 DIAGNOSIS — M54.50 CHRONIC RIGHT-SIDED LOW BACK PAIN WITHOUT SCIATICA: ICD-10-CM

## 2020-08-19 DIAGNOSIS — G89.29 CHRONIC RIGHT-SIDED LOW BACK PAIN WITHOUT SCIATICA: ICD-10-CM

## 2020-08-19 PROCEDURE — 97110 THERAPEUTIC EXERCISES: CPT

## 2020-08-19 NOTE — OP THERAPY DAILY TREATMENT
Outpatient Physical Therapy  DAILY TREATMENT     [facility] Outpatient Physical Therapy [place]  [address]  [place] [place] [facility] [facility] [address]  Phone:  [phone]  Fax:  [phone]    Date: 08/19/2020    Patient: Blake Garcia  YOB: 1950  MRN: [id number]     Time Calculation    Start time: 0900  Stop time: 0930 Time Calculation (min): 30 minutes         Chief Complaint: Back Problem    Visit #: 4    SUBJECTIVE:  Patient reports that her back symptoms are still present upon waking. Notes that during the day her pain has decreased. Notes that the tape helped her knee.     OBJECTIVE:  Current objective measures:           Therapeutic Exercises (CPT 39444):     1. Nu Step, x10min    2. Seated on DD with LE lifts, x10    3. Trial stapping to knee, x2min    4. Post pelvic tilt, lumbar rolls, heel slides, x10      Therapeutic Exercise Summary:  Access Code: [email or web address]        Exercises Hooklying Transversus Abdominis Palpation - 10 reps - 2 sets - 1x daily - 7x weekly   Clamshell - 10 reps - 2 sets - 1x daily - 7x weekly   Bridge with Arms at Sides and Feet on Swiss Ball - 10 reps - 2 sets - 1x daily - 7x weekly   Beginner Bridge - 10 reps - 2 sets - 1x daily - 7x weekly         Time-based treatments/modalities:    Physical Therapy Timed Treatment Charges  Therapeutic exercise minutes (CPT 33829): 30 minutes      Pain rating (1-10) before treatment:  1  Pain rating (1-10) after treatment:  1    ASSESSMENT:   Response to treatment: Patient responded well to strapping with decreased pain. Patient to trail 3x am exercise and report back.     PLAN/RECOMMENDATIONS:   Plan for treatment: therapy treatment to continue next visit.  Planned interventions for next visit: continue with current treatment.

## 2020-08-21 ENCOUNTER — PHYSICAL THERAPY (OUTPATIENT)
Dept: PHYSICAL THERAPY | Facility: REHABILITATION | Age: 70
End: 2020-08-21
Attending: FAMILY MEDICINE
Payer: MEDICARE

## 2020-08-21 DIAGNOSIS — G89.29 CHRONIC RIGHT-SIDED LOW BACK PAIN WITHOUT SCIATICA: ICD-10-CM

## 2020-08-21 DIAGNOSIS — M54.50 CHRONIC RIGHT-SIDED LOW BACK PAIN WITHOUT SCIATICA: ICD-10-CM

## 2020-08-21 PROCEDURE — 97110 THERAPEUTIC EXERCISES: CPT

## 2020-08-21 NOTE — OP THERAPY DAILY TREATMENT
Outpatient Physical Therapy  DAILY TREATMENT     [facility] Out[facility] Physical Therapy [place]  [address], [address]  [facility] [address]  Phone:  [phone]  Fax:  [phone]    Date: 08/21/2020    Patient: Blake Garcia  YOB: 1950  MRN: [id number]     Time Calculation    Start time: 0930  Stop time: 1000 Time Calculation (min): 30 minutes         Chief Complaint: Back Problem and Knee Problem    Visit #: 5    SUBJECTIVE:  Patient reports decreased pain and decreased frequency of pain. Notes overall symptoms    OBJECTIVE:  Current objective measures: decreased pain with knee strapping.          Therapeutic Exercises (CPT 67403):       Therapeutic Exercise Summary:  Access Code: [email or web address]       Exercises Hooklying Transversus Abdominis Palpation - 10 reps - 2 sets - 1x daily - 7x weekly   Clamshell - 10 reps - 2 sets - 1x daily - 7x weekly   Bridge with Arms at Sides and Feet on Swiss Ball - 10 reps - 2 sets - 1x daily - 7x weekly   Beginner Bridge - 10 reps - 2 sets - 1x daily - 7x weekly   Bird Dog on Swiss Ball - 10 reps - 2 sets - 1x daily - 7x weekly   Hooklying Transversus Abdominis Palpation - 10 reps - 2 sets - 1x daily - 7x weekly   Seated Hip Abduction with Resistance - 10 reps - 2 sets - 1x daily - 7x weekly   Tandem Stance with Eyes Closed in Corner - 10 reps - 2 sets - 1x daily - 7x weekly       Therapeutic Treatments and Modalities:     1. Manual Therapy (CPT 81559), trial knee strapping    Time-based treatments/modalities:    Physical Therapy Timed Treatment Charges  Therapeutic exercise minutes (CPT 27797): 30 minutes      Pain rating (1-10) before treatment:  2  Pain rating (1-10) after treatment:  2    ASSESSMENT:   Response to treatment: Patient is demonstrating improvement with dynamic control. Patient's knee is a limiting factor with care.     PLAN/RECOMMENDATIONS:   Plan for treatment: therapy treatment to continue next visit.  Planned interventions for next visit: continue  with current treatment.

## 2020-08-26 ENCOUNTER — PHYSICAL THERAPY (OUTPATIENT)
Dept: PHYSICAL THERAPY | Facility: REHABILITATION | Age: 70
End: 2020-08-26
Attending: FAMILY MEDICINE
Payer: MEDICARE

## 2020-08-26 ENCOUNTER — PATIENT MESSAGE (OUTPATIENT)
Dept: MEDICAL GROUP | Facility: PHYSICIAN GROUP | Age: 70
End: 2020-08-26

## 2020-08-26 DIAGNOSIS — G89.29 CHRONIC PAIN OF BOTH KNEES: ICD-10-CM

## 2020-08-26 DIAGNOSIS — M25.562 CHRONIC PAIN OF BOTH KNEES: ICD-10-CM

## 2020-08-26 DIAGNOSIS — G89.29 CHRONIC RIGHT-SIDED LOW BACK PAIN WITHOUT SCIATICA: ICD-10-CM

## 2020-08-26 DIAGNOSIS — M25.561 CHRONIC PAIN OF BOTH KNEES: ICD-10-CM

## 2020-08-26 DIAGNOSIS — M54.50 CHRONIC RIGHT-SIDED LOW BACK PAIN WITHOUT SCIATICA: ICD-10-CM

## 2020-08-26 PROCEDURE — 97110 THERAPEUTIC EXERCISES: CPT

## 2020-08-26 NOTE — OP THERAPY DAILY TREATMENT
Outpatient Physical Therapy  DAILY TREATMENT     [facility] Out[facility] Physical Therapy [place]  [address]  [place] [address]  Phone:  [phone]  Fax:  [phone]    Date: 08/26/2020    Patient: Blake Garcia  YOB: 1950  MRN: [id number]     Time Calculation    Start time: 1330  Stop time: 1400 Time Calculation (min): 30 minutes         Chief Complaint: Back Problem    Visit #: 6    SUBJECTIVE:  Patient reports that her back is doing well. Notes overall decreased pain and only intermittent bouts of LBP.     OBJECTIVE:  Current objective measures:           Therapeutic Exercises (CPT 89406):       Therapeutic Exercise Summary:  Access Code: [email or web address]       Exercises Hooklying Transversus Abdominis Palpation - 10 reps - 2 sets - 1x daily - 7x weekly   Clamshell - 10 reps - 2 sets - 1x daily - 7x weekly   Bridge with Arms at Sides and Feet on Swiss Ball - 10 reps - 2 sets - 1x daily - 7x weekly   Beginner Bridge - 10 reps - 2 sets - 1x daily - 7x weekly   Bird Dog on Swiss Ball - 10 reps - 2 sets - 1x daily - 7x weekly   Hooklying Transversus Abdominis Palpation - 10 reps - 2 sets - 1x daily - 7x weekly   Seated Hip Abduction with Resistance - 10 reps - 2 sets - 1x daily - 7x weekly   Tandem Stance with Eyes Closed in Corner - 10 reps - 2 sets - 1x daily - 7x weekly   Sidelying Diagonal Hip Abduction - 10 reps - 2 sets - 1x daily - 7x weekly         Time-based treatments/modalities:    Physical Therapy Timed Treatment Charges  Therapeutic exercise minutes (CPT 33900): 30 minutes      Pain rating (1-10) before treatment:  0  Pain rating (1-10) after treatment:  0    ASSESSMENT:   Response to treatment: Patient responded well to therapy with an overall decrease in symptoms and a return to function in regards to her back.     PLAN/RECOMMENDATIONS:   Plan for treatment: discharge patient due to accomplished goals.  Planned interventions for next visit: Discharge to [facility].

## 2020-08-26 NOTE — OP THERAPY DISCHARGE SUMMARY
Outpatient Physical Therapy  DISCHARGE SUMMARY NOTE      [facility] [place]  [address]  [place] [address]  Phone:  [phone]  Fax:  [phone]    Date of Visit: 08/26/2020    Patient: Blake Garcia  YOB: 1950  MRN: [id number]     Referring Provider: Bertha Gómez M.D.  [address] [place] [address]  [address]  [place] [address]   Referring Diagnosis Low back pain [M54.5]           Your patient is being discharged from Physical Therapy with the following comments:   · Goals partially met    Comments:  Patient will now continue with [facility]P.      Limitations Remaining:  Intermittent LBP    Recommendations:  Discharge to HEP    Xavier White, PT, DPT    Date: 8/26/2020

## 2020-08-28 ENCOUNTER — APPOINTMENT (OUTPATIENT)
Dept: PHYSICAL THERAPY | Facility: REHABILITATION | Age: 70
End: 2020-08-28
Attending: FAMILY MEDICINE
Payer: MEDICARE

## 2020-09-01 ENCOUNTER — APPOINTMENT (OUTPATIENT)
Dept: PHYSICAL THERAPY | Facility: REHABILITATION | Age: 70
End: 2020-09-01
Attending: FAMILY MEDICINE
Payer: MEDICARE

## 2020-09-02 ENCOUNTER — APPOINTMENT (OUTPATIENT)
Dept: PHYSICAL THERAPY | Facility: REHABILITATION | Age: 70
End: 2020-09-02
Attending: FAMILY MEDICINE
Payer: MEDICARE

## 2020-09-04 ENCOUNTER — APPOINTMENT (OUTPATIENT)
Dept: PHYSICAL THERAPY | Facility: REHABILITATION | Age: 70
End: 2020-09-04
Attending: FAMILY MEDICINE
Payer: MEDICARE

## 2020-09-09 ENCOUNTER — APPOINTMENT (OUTPATIENT)
Dept: PHYSICAL THERAPY | Facility: REHABILITATION | Age: 70
End: 2020-09-09
Attending: FAMILY MEDICINE
Payer: MEDICARE

## 2020-09-11 ENCOUNTER — APPOINTMENT (OUTPATIENT)
Dept: PHYSICAL THERAPY | Facility: REHABILITATION | Age: 70
End: 2020-09-11
Attending: FAMILY MEDICINE
Payer: MEDICARE

## 2020-09-16 ENCOUNTER — APPOINTMENT (OUTPATIENT)
Dept: PHYSICAL THERAPY | Facility: REHABILITATION | Age: 70
End: 2020-09-16
Attending: FAMILY MEDICINE
Payer: MEDICARE

## 2020-09-18 ENCOUNTER — APPOINTMENT (OUTPATIENT)
Dept: PHYSICAL THERAPY | Facility: REHABILITATION | Age: 70
End: 2020-09-18
Attending: FAMILY MEDICINE
Payer: MEDICARE

## 2020-11-24 ENCOUNTER — TELEPHONE (OUTPATIENT)
Dept: MEDICAL GROUP | Facility: PHYSICIAN GROUP | Age: 70
End: 2020-11-24

## 2020-11-24 DIAGNOSIS — Z13.29 SCREENING FOR ENDOCRINE DISORDER: ICD-10-CM

## 2020-11-24 DIAGNOSIS — Z13.6 SCREENING FOR CARDIOVASCULAR CONDITION: ICD-10-CM

## 2020-11-25 ENCOUNTER — NON-PROVIDER VISIT (OUTPATIENT)
Dept: MEDICAL GROUP | Facility: PHYSICIAN GROUP | Age: 70
End: 2020-11-25
Payer: MEDICARE

## 2020-11-25 DIAGNOSIS — Z23 NEED FOR VACCINATION: ICD-10-CM

## 2020-11-25 PROCEDURE — 90662 IIV NO PRSV INCREASED AG IM: CPT | Performed by: FAMILY MEDICINE

## 2020-11-25 PROCEDURE — G0008 ADMIN INFLUENZA VIRUS VAC: HCPCS | Performed by: FAMILY MEDICINE

## 2020-11-25 PROCEDURE — 99999 PR NO CHARGE: CPT | Performed by: FAMILY MEDICINE

## 2020-11-25 NOTE — TELEPHONE ENCOUNTER
Patient cancelled her appointment for next week because of COVID.  She is asking if you can order wm labs for her.  She is also asking for Vitamin D.  Please advise.    Thank you.

## 2020-11-25 NOTE — PROGRESS NOTES
"Blake Garcia is a 69 y.o. female here for a non-provider visit for:   FLU    Reason for immunization: Annual Flu Vaccine  Immunization records indicate need for vaccine: Yes, confirmed with Epic and confirmed with [place] [employer]  Minimum interval has been met for this vaccine: Yes  ABN completed: Not Indicated    Order and dose verified by: MT  VIS Dated  08/15/19 was given to patient: Yes  All IAC Questionnaire questions were answered \"No.\"    Patient tolerated injection and no adverse effects were observed or reported: Yes    Pt scheduled for next dose in series: No    "

## 2020-12-10 ENCOUNTER — HOSPITAL ENCOUNTER (OUTPATIENT)
Dept: LAB | Facility: MEDICAL CENTER | Age: 70
End: 2020-12-10
Attending: FAMILY MEDICINE
Payer: MEDICARE

## 2020-12-10 DIAGNOSIS — Z13.29 SCREENING FOR ENDOCRINE DISORDER: ICD-10-CM

## 2020-12-10 DIAGNOSIS — Z13.6 SCREENING FOR CARDIOVASCULAR CONDITION: ICD-10-CM

## 2020-12-10 LAB
25(OH)D3 SERPL-MCNC: 43 NG/ML (ref 30–100)
ALBUMIN SERPL BCP-MCNC: 4.1 G/DL (ref 3.2–4.9)
ALBUMIN/GLOB SERPL: 1.4 G/DL
ALP SERPL-CCNC: 61 U/L (ref 30–99)
ALT SERPL-CCNC: 16 U/L (ref 2–50)
ANION GAP SERPL CALC-SCNC: 8 MMOL/L (ref 7–16)
AST SERPL-CCNC: 20 U/L (ref 12–45)
BILIRUB SERPL-MCNC: 0.6 MG/DL (ref 0.1–1.5)
BUN SERPL-MCNC: 18 MG/DL (ref 8–22)
CALCIUM SERPL-MCNC: 9.8 MG/DL (ref 8.5–10.5)
CHLORIDE SERPL-SCNC: 103 MMOL/L (ref 96–112)
CHOLEST SERPL-MCNC: 182 MG/DL (ref 100–199)
CO2 SERPL-SCNC: 27 MMOL/L (ref 20–33)
CREAT SERPL-MCNC: 0.58 MG/DL (ref 0.5–1.4)
FASTING STATUS PATIENT QL REPORTED: NORMAL
GLOBULIN SER CALC-MCNC: 2.9 G/DL (ref 1.9–3.5)
GLUCOSE SERPL-MCNC: 82 MG/DL (ref 65–99)
HDLC SERPL-MCNC: 85 MG/DL
LDLC SERPL CALC-MCNC: 87 MG/DL
POTASSIUM SERPL-SCNC: 4.5 MMOL/L (ref 3.6–5.5)
PROT SERPL-MCNC: 7 G/DL (ref 6–8.2)
SODIUM SERPL-SCNC: 138 MMOL/L (ref 135–145)
TRIGL SERPL-MCNC: 49 MG/DL (ref 0–149)

## 2020-12-10 PROCEDURE — 80053 COMPREHEN METABOLIC PANEL: CPT

## 2020-12-10 PROCEDURE — 82306 VITAMIN D 25 HYDROXY: CPT | Mod: GA

## 2020-12-10 PROCEDURE — 36415 COLL VENOUS BLD VENIPUNCTURE: CPT | Mod: GA

## 2020-12-10 PROCEDURE — 80061 LIPID PANEL: CPT

## 2021-03-03 DIAGNOSIS — Z23 NEED FOR VACCINATION: ICD-10-CM

## 2021-07-22 PROBLEM — M17.12 PRIMARY LOCALIZED OSTEOARTHROSIS OF THE KNEE, LEFT: Status: ACTIVE | Noted: 2021-07-22

## 2021-09-01 ENCOUNTER — HOSPITAL ENCOUNTER (OUTPATIENT)
Dept: RADIOLOGY | Facility: MEDICAL CENTER | Age: 71
End: 2021-09-01
Attending: FAMILY MEDICINE
Payer: MEDICARE

## 2021-09-01 DIAGNOSIS — Z12.31 VISIT FOR SCREENING MAMMOGRAM: ICD-10-CM

## 2021-09-01 PROCEDURE — 77063 BREAST TOMOSYNTHESIS BI: CPT

## 2021-09-16 ENCOUNTER — HOSPITAL ENCOUNTER (OUTPATIENT)
Facility: MEDICAL CENTER | Age: 71
End: 2021-09-16
Attending: ANESTHESIOLOGY
Payer: MEDICARE

## 2021-09-16 LAB
COVID ORDER STATUS COVID19: NORMAL
SARS-COV-2 RNA RESP QL NAA+PROBE: NOTDETECTED
SPECIMEN SOURCE: NORMAL

## 2021-09-16 PROCEDURE — U0003 INFECTIOUS AGENT DETECTION BY NUCLEIC ACID (DNA OR RNA); SEVERE ACUTE RESPIRATORY SYNDROME CORONAVIRUS 2 (SARS-COV-2) (CORONAVIRUS DISEASE [COVID-19]), AMPLIFIED PROBE TECHNIQUE, MAKING USE OF HIGH THROUGHPUT TECHNOLOGIES AS DESCRIBED BY CMS-2020-01-R: HCPCS

## 2021-09-16 PROCEDURE — U0005 INFEC AGEN DETEC AMPLI PROBE: HCPCS

## 2022-11-07 ENCOUNTER — PATIENT MESSAGE (OUTPATIENT)
Dept: HEALTH INFORMATION MANAGEMENT | Facility: OTHER | Age: 72
End: 2022-11-07